# Patient Record
Sex: FEMALE | Race: OTHER | Employment: FULL TIME | URBAN - METROPOLITAN AREA
[De-identification: names, ages, dates, MRNs, and addresses within clinical notes are randomized per-mention and may not be internally consistent; named-entity substitution may affect disease eponyms.]

---

## 2018-09-13 ENCOUNTER — OFFICE VISIT (OUTPATIENT)
Dept: FAMILY MEDICINE CLINIC | Facility: CLINIC | Age: 38
End: 2018-09-13
Payer: COMMERCIAL

## 2018-09-13 VITALS
HEART RATE: 80 BPM | DIASTOLIC BLOOD PRESSURE: 82 MMHG | HEIGHT: 63 IN | WEIGHT: 155 LBS | BODY MASS INDEX: 27.46 KG/M2 | SYSTOLIC BLOOD PRESSURE: 126 MMHG

## 2018-09-13 DIAGNOSIS — K64.8 OTHER HEMORRHOIDS: Primary | ICD-10-CM

## 2018-09-13 DIAGNOSIS — D17.24 LIPOMA OF LEFT LOWER EXTREMITY: ICD-10-CM

## 2018-09-13 PROCEDURE — 99213 OFFICE O/P EST LOW 20 MIN: CPT | Performed by: NURSE PRACTITIONER

## 2018-09-13 NOTE — PROGRESS NOTES
HPI    Pt presents with lump to left lateral thigh for past two years or so. Denies pain but has noticed lately that has some numbness to leg when walking a lot. Pt also has h/o hemorrhoids-was evaluated in past and given cream but still having s/s.  Wou No current outpatient medications on file. Allergies:  No Known Allergies    Physical Exam   Nursing note and vitals reviewed. Constitutional: She is oriented to person, place, and time. She appears well-developed and well-nourished.    Cardiovascular:

## 2018-09-13 NOTE — PATIENT INSTRUCTIONS
Hemorroides    Las hemorroides son Earp Gayer hinchadas e inflamadas dentro del recto y cerca del ano. El recto está formado por el último tramo del colon. El ano es el pasaje entre el recto y el exterior del cuerpo.   Causas   Las venas pueden inflamarse cuan · Coma más fibra. La fibra agrega volumen a las heces y absorbe agua al ir moviéndose por el colon. [place] hace que las heces se ablanden y resulte más fácil expulsarlas. ? Aumente la fibra en pandey dieta. Para eso coma más alimentos ricos en fibra.  Estos incl © 9724-7265 [address]. [address] [facility] [place], [address] [facility]. Todos los derechos reservados. Esta información no pretende sustituir la atención médica profesional. Sólo pandey médico puede diagnosticar y tratar un problema de michelle. En la escleroterapia se inyecta katherine sustancia química en el tejido que rodea la hemorroide. Esta sustancia provoca la contracción del vaso hinchado en un plazo de varios días. Generalmente, la clifton mayi de sangrar al cabo de 24 horas.   Hemorroides externas © 3923-7900 [address]. 1407 [facility], 1612 [facility]. Todos los derechos reservados. Esta información no pretende sustituir la atención médica profesional. Sólo pandey médico puede diagnosticar y tratar un problema de michelle.

## 2018-09-20 ENCOUNTER — OFFICE VISIT (OUTPATIENT)
Dept: SURGERY | Facility: CLINIC | Age: 38
End: 2018-09-20
Payer: COMMERCIAL

## 2018-09-20 ENCOUNTER — HOSPITAL ENCOUNTER (OUTPATIENT)
Dept: ULTRASOUND IMAGING | Facility: HOSPITAL | Age: 38
Discharge: HOME | End: 2018-09-20
Attending: NURSE PRACTITIONER
Payer: COMMERCIAL

## 2018-09-20 VITALS — WEIGHT: 155 LBS | HEIGHT: 63 IN | BODY MASS INDEX: 27.46 KG/M2

## 2018-09-20 DIAGNOSIS — K64.4 EXTERNAL HEMORRHOIDS: Primary | ICD-10-CM

## 2018-09-20 DIAGNOSIS — D17.24 LIPOMA OF LEFT LOWER EXTREMITY: ICD-10-CM

## 2018-09-20 DIAGNOSIS — D17.24 BENIGN LIPOMATOUS NEOPLASM OF SKIN AND SUBCUTANEOUS TISSUE OF LEFT LEG: Primary | ICD-10-CM

## 2018-09-20 PROCEDURE — 99212 OFFICE O/P EST SF 10 MIN: CPT | Performed by: SURGERY

## 2018-09-20 PROCEDURE — 76882 US LMTD JT/FCL EVL NVASC XTR: CPT | Performed by: NURSE PRACTITIONER

## 2018-09-20 PROCEDURE — 99244 OFF/OP CNSLTJ NEW/EST MOD 40: CPT | Performed by: SURGERY

## 2018-09-20 NOTE — H&P
History and Physical      Prince Vicente is a 34year old female. HPI   Patient presents with:  Hemorrhoids: Pt referred by ALEX Gonzalez regarding external hemorrhoids x few yrs. Pt c/o intermittent bleeding w/ bm's.   Pt denies constipation @ [employer]    PHYSICAL EXAM   Ht 5' 3\" (1.6 m)   Wt 155 lb (70.3 kg)   LMP 09/02/2018   BMI 27.46 kg/m²  Patient's last menstrual period was 09/02/2018.   Constitutional: appears well hydrated alert and responsive no acute distress noted  Head/Face: normoceph with medical tx - ok to schedule excision - she understands risks.          9/20/2018  Thomas Olivares MD

## 2018-09-24 NOTE — PROGRESS NOTES
Called with the assistance of language line solutions (#). [phone]  Attempted to call preferred# [phone] no answer no voice mail  Attempted to call Mobile#:  [phone] number not in service

## 2018-09-25 ENCOUNTER — TELEPHONE (OUTPATIENT)
Dept: OTHER | Age: 38
End: 2018-09-25

## 2018-09-25 NOTE — TELEPHONE ENCOUNTER
Notes recorded by Benoit Patel RN on 9/25/2018 at 12:02 PM CDT  Preferred #, no answer. Unable to leave msg.  Mobile # rings and disconnects. Will send no response lab letter.   ------    Notes recorded by Emma Quiroz RN on 9/24/2018 at 10:44 AM CDT

## 2018-09-25 NOTE — TELEPHONE ENCOUNTER
Pt called back and was inform of Mathieu Cox message below and she verbalized understanding.  Thanks

## 2018-09-27 ENCOUNTER — OFFICE VISIT (OUTPATIENT)
Dept: OTOLARYNGOLOGY | Facility: CLINIC | Age: 38
End: 2018-09-27
Payer: COMMERCIAL

## 2018-09-27 VITALS
SYSTOLIC BLOOD PRESSURE: 118 MMHG | BODY MASS INDEX: 27.46 KG/M2 | DIASTOLIC BLOOD PRESSURE: 70 MMHG | TEMPERATURE: 98 F | HEIGHT: 63 IN | WEIGHT: 155 LBS

## 2018-09-27 DIAGNOSIS — J34.2 DEVIATED NASAL SEPTUM: ICD-10-CM

## 2018-09-27 DIAGNOSIS — J30.9 ALLERGIC RHINITIS, UNSPECIFIED SEASONALITY, UNSPECIFIED TRIGGER: Primary | ICD-10-CM

## 2018-09-27 PROCEDURE — 99212 OFFICE O/P EST SF 10 MIN: CPT | Performed by: OTOLARYNGOLOGY

## 2018-09-27 PROCEDURE — 99243 OFF/OP CNSLTJ NEW/EST LOW 30: CPT | Performed by: OTOLARYNGOLOGY

## 2018-09-27 RX ORDER — AZELASTINE 1 MG/ML
2 SPRAY, METERED NASAL 2 TIMES DAILY
Qty: 1 BOTTLE | Refills: 0 | Status: SHIPPED | OUTPATIENT
Start: 2018-09-27 | End: 2020-11-19 | Stop reason: ALTCHOICE

## 2018-09-27 RX ORDER — FLUTICASONE PROPIONATE 50 MCG
SPRAY, SUSPENSION (ML) NASAL DAILY
COMMUNITY
End: 2020-11-19 | Stop reason: ALTCHOICE

## 2018-09-27 RX ORDER — FEXOFENADINE HCL 180 MG/1
180 TABLET ORAL DAILY
COMMUNITY
End: 2020-11-19 | Stop reason: ALTCHOICE

## 2018-09-27 RX ORDER — MONTELUKAST SODIUM 10 MG/1
10 TABLET ORAL NIGHTLY
Qty: 30 TABLET | Refills: 3 | Status: SHIPPED | OUTPATIENT
Start: 2018-09-27 | End: 2020-11-19 | Stop reason: ALTCHOICE

## 2018-09-28 NOTE — PROGRESS NOTES
Miroslava Hawkins is a 34year old female.   Patient presents with:  Sinus Problem: congestion that worsens with change in season;symptoms have been ongoing for 5 yrs      HISTORY OF PRESENT ILLNESS  She presents with a 5-year history of worsening nasal congestion Endocrine Negative Cold intolerance and heat intolerance. Neuro Negative Tremors. Psych Negative Anxiety and depression. Integumentary Negative Frequent skin infections, pigment change and rash.    Hema/Lymph Negative Easy bleeding and easy bruising Azelastine HCl 0.1 % Nasal Solution, 2 sprays by Nasal route 2 (two) times daily. , Disp: 1 Bottle, Rfl: 0  •  Montelukast Sodium 10 MG Oral Tab, Take 1 tablet (10 mg total) by mouth nightly., Disp: 30 tablet, Rfl: 3  •  Loratadine-Pseudoephedrine ER 5-120

## 2018-10-04 ENCOUNTER — LAB REQUISITION (OUTPATIENT)
Dept: LAB | Facility: HOSPITAL | Age: 38
End: 2018-10-04
Payer: COMMERCIAL

## 2018-10-04 DIAGNOSIS — Z01.89 ENCOUNTER FOR OTHER SPECIFIED SPECIAL EXAMINATIONS: ICD-10-CM

## 2018-10-04 PROCEDURE — 88304 TISSUE EXAM BY PATHOLOGIST: CPT | Performed by: SURGERY

## 2018-10-10 ENCOUNTER — TELEPHONE (OUTPATIENT)
Dept: SURGERY | Facility: CLINIC | Age: 38
End: 2018-10-10

## 2018-10-10 NOTE — TELEPHONE ENCOUNTER
\"No precert required\" per Jose at [facility] for procedure on 10/04/2018, reference #[id number].

## 2018-10-12 ENCOUNTER — OFFICE VISIT (OUTPATIENT)
Dept: SURGERY | Facility: CLINIC | Age: 38
End: 2018-10-12
Payer: COMMERCIAL

## 2018-10-12 DIAGNOSIS — K64.4 ANAL SKIN TAG: ICD-10-CM

## 2018-10-12 DIAGNOSIS — K64.4 EXTERNAL HEMORRHOID: Primary | ICD-10-CM

## 2018-10-12 PROCEDURE — 99212 OFFICE O/P EST SF 10 MIN: CPT | Performed by: SURGERY

## 2018-10-12 PROCEDURE — 99024 POSTOP FOLLOW-UP VISIT: CPT | Performed by: SURGERY

## 2018-10-13 NOTE — PROGRESS NOTES
Postoperative Patient Follow-up      10/13/2018    Karen Lowe 34year old      HPI  Patient presents with:  Post-Op: Pt here for 1st post op s/p hemorrhoidectomy on 10/4/18. Pt c/o intermittent pain to area. Pt also noticed minimal blood with b/m.   Pt us

## 2018-10-25 ENCOUNTER — OFFICE VISIT (OUTPATIENT)
Dept: OTOLARYNGOLOGY | Facility: CLINIC | Age: 38
End: 2018-10-25
Payer: COMMERCIAL

## 2018-10-25 VITALS
TEMPERATURE: 98 F | DIASTOLIC BLOOD PRESSURE: 78 MMHG | SYSTOLIC BLOOD PRESSURE: 106 MMHG | BODY MASS INDEX: 27.46 KG/M2 | WEIGHT: 155 LBS | HEIGHT: 63 IN

## 2018-10-25 DIAGNOSIS — J34.2 DEVIATED NASAL SEPTUM: Primary | ICD-10-CM

## 2018-10-25 PROCEDURE — 99214 OFFICE O/P EST MOD 30 MIN: CPT | Performed by: OTOLARYNGOLOGY

## 2018-10-25 PROCEDURE — 99212 OFFICE O/P EST SF 10 MIN: CPT | Performed by: OTOLARYNGOLOGY

## 2018-10-25 NOTE — PROGRESS NOTES
Anjelica Bernal is a 34year old female. Patient presents with: Follow - Up: allergic rhinitis: pt reports a little improvement       HISTORY OF PRESENT ILLNESS  She presents with a 5-year history of worsening nasal congestion and allergies.   Always has diffi yrs ago       History reviewed. No pertinent past medical history.     Past Surgical History:   Procedure Laterality Date   • CORRECT BUNION,SIMPLE Left 2011   • HEMORRHOIDECTOMY  93/42/2043   • UMBILICAL HERNIA REPAIR  2008         REVIEW OF SYSTEMS    Sys Submandibular. Anterior cervical. Posterior cervical. Supraclavicular.         Nose/Mouth/Throat Normal External nose - Normal. Lips/teeth/gums - Normal. Tonsils - Normal. Oropharynx - Normal.   Nose/Mouth/Throat Normal Nares - Right: Normal Left: Normal. S

## 2018-10-31 ENCOUNTER — TELEPHONE (OUTPATIENT)
Dept: OTOLARYNGOLOGY | Facility: CLINIC | Age: 38
End: 2018-10-31

## 2018-11-01 NOTE — TELEPHONE ENCOUNTER
Pt contacted, scheduled surgery on 11/21/18 with Dr. Nicki Sanders. Pre-post op instructions reviewed.

## 2018-11-16 ENCOUNTER — OFFICE VISIT (OUTPATIENT)
Dept: SURGERY | Facility: CLINIC | Age: 38
End: 2018-11-16
Payer: COMMERCIAL

## 2018-11-16 VITALS — BODY MASS INDEX: 27 KG/M2 | WEIGHT: 155 LBS

## 2018-11-16 DIAGNOSIS — K64.8 INTERNAL HEMORRHOIDS WITH COMPLICATION: Primary | ICD-10-CM

## 2018-11-16 PROCEDURE — 99024 POSTOP FOLLOW-UP VISIT: CPT | Performed by: SURGERY

## 2018-11-16 PROCEDURE — 99212 OFFICE O/P EST SF 10 MIN: CPT | Performed by: SURGERY

## 2018-11-16 NOTE — PROGRESS NOTES
Established Patient Follow-up      11/16/2018    Liss Joshua 34year old      HPI  Patient presents with:  Post-Op: S/P Hemorrhoidectomy 10/4/18. Patient states she feels good, no problems with bowels, denies bleeding.   Is eating fiber and taking stool sof

## 2018-11-23 ENCOUNTER — TELEPHONE (OUTPATIENT)
Dept: OTOLARYNGOLOGY | Facility: CLINIC | Age: 38
End: 2018-11-23

## 2018-11-24 ENCOUNTER — TELEPHONE (OUTPATIENT)
Dept: OTOLARYNGOLOGY | Facility: CLINIC | Age: 38
End: 2018-11-24

## 2018-11-27 ENCOUNTER — TELEPHONE (OUTPATIENT)
Dept: OTOLARYNGOLOGY | Facility: CLINIC | Age: 38
End: 2018-11-27

## 2018-11-27 NOTE — TELEPHONE ENCOUNTER
Patient returning call for post op call.  Bengali speaking please call ( previous encounter was closed out)

## 2018-11-27 NOTE — TELEPHONE ENCOUNTER
Pt is post op  septoplasty, SMR. Per  Pt pt is doing well no bleeding, advised pt no bending or heavy lifting for the next week and pt is not to blow nose until seen by JDO.  Advised pt she can start using OTC saline nasal spray daily,apply vaseline if need

## 2018-11-29 ENCOUNTER — OFFICE VISIT (OUTPATIENT)
Dept: OTOLARYNGOLOGY | Facility: CLINIC | Age: 38
End: 2018-11-29
Payer: COMMERCIAL

## 2018-11-29 VITALS
WEIGHT: 155 LBS | BODY MASS INDEX: 27.46 KG/M2 | TEMPERATURE: 98 F | SYSTOLIC BLOOD PRESSURE: 128 MMHG | DIASTOLIC BLOOD PRESSURE: 80 MMHG | HEIGHT: 63 IN

## 2018-11-29 DIAGNOSIS — J34.2 DEVIATED NASAL SEPTUM: Primary | ICD-10-CM

## 2018-11-29 PROCEDURE — 99024 POSTOP FOLLOW-UP VISIT: CPT | Performed by: OTOLARYNGOLOGY

## 2018-11-29 PROCEDURE — 99212 OFFICE O/P EST SF 10 MIN: CPT | Performed by: OTOLARYNGOLOGY

## 2018-11-29 RX ORDER — CEPHALEXIN 500 MG/1
CAPSULE ORAL
Refills: 0 | COMMUNITY
Start: 2018-11-21 | End: 2020-11-19 | Stop reason: ALTCHOICE

## 2018-11-29 RX ORDER — DOCUSATE SODIUM 100 MG/1
CAPSULE, LIQUID FILLED ORAL
Refills: 0 | COMMUNITY
Start: 2018-10-04 | End: 2020-11-19 | Stop reason: ALTCHOICE

## 2018-11-30 NOTE — PROGRESS NOTES
Kobe Abreu is a 34year old female.   Patient presents with:  Post-Op: Septoplasty and SMR done on 11/21/18; pt reports she is doing well      HISTORY OF PRESENT ILLNESS  She presents with a 5-year history of worsening nasal congestion and allergies. Etta Marie BUNION,SIMPLE Left 2011   • EXCISION TURBINATE,SUBMUCOUS  11/21/2018   • HEMORRHOIDECTOMY  10/04/2018     x 2   • REPAIR OF NASAL SEPTUM  31/05/4376   • UMBILICAL HERNIA REPAIR  2008         REVIEW OF SYSTEMS    System Neg/Pos Details   Constitutional Knute Pro Posterior cervical. Supraclavicular.         Nose/Mouth/Throat Normal External nose - Normal. Lips/teeth/gums - Normal. Tonsils - Normal. Oropharynx - Normal.   Nose/Mouth/Throat Normal Nares - Right: Normal Left: Normal. Septum -Normal  Turbinates - Right:

## 2020-03-13 ENCOUNTER — OFFICE VISIT (OUTPATIENT)
Dept: FAMILY MEDICINE CLINIC | Facility: CLINIC | Age: 40
End: 2020-03-13
Payer: COMMERCIAL

## 2020-03-13 VITALS
WEIGHT: 152 LBS | BODY MASS INDEX: 26.93 KG/M2 | SYSTOLIC BLOOD PRESSURE: 127 MMHG | HEART RATE: 94 BPM | DIASTOLIC BLOOD PRESSURE: 87 MMHG | TEMPERATURE: 98 F | HEIGHT: 63 IN

## 2020-03-13 DIAGNOSIS — J35.8 TONSILLAR EXUDATE: Primary | ICD-10-CM

## 2020-03-13 PROCEDURE — 99213 OFFICE O/P EST LOW 20 MIN: CPT | Performed by: NURSE PRACTITIONER

## 2020-03-13 RX ORDER — AMOXICILLIN AND CLAVULANATE POTASSIUM 875; 125 MG/1; MG/1
1 TABLET, FILM COATED ORAL 2 TIMES DAILY
Qty: 14 TABLET | Refills: 0 | Status: SHIPPED | OUTPATIENT
Start: 2020-03-13 | End: 2020-03-20

## 2020-03-13 NOTE — PROGRESS NOTES
HPI    Patient presents for sore throat x 2 days. Denies fever. Review of Systems   Constitutional: Negative for fever. HENT: Positive for sore throat.     All other systems reviewed and are negative.       03/13/20  1303   BP: 127/87   Pulse: 94   T Talks on phone: Not on file        Gets together: Not on file        Attends Hindu service: Not on file        Active member of club or organization: Not on file        Attends meetings of clubs or organizations: Not on file        Relationship status: cerumen present  Mouth/Throat: Posterior oropharyngeal edema, posterior oropharyngeal erythema and tonsillar abscesses present. Eyes: Conjunctivae are normal. Right eye exhibits no discharge. Left eye exhibits no discharge.    Neck: Normal range of motion

## 2020-07-01 ENCOUNTER — HOSPITAL ENCOUNTER (OUTPATIENT)
Age: 40
Discharge: HOME | End: 2020-07-01
Attending: EMERGENCY MEDICINE
Payer: COMMERCIAL

## 2020-07-01 VITALS
TEMPERATURE: 98 F | RESPIRATION RATE: 16 BRPM | DIASTOLIC BLOOD PRESSURE: 88 MMHG | OXYGEN SATURATION: 100 % | SYSTOLIC BLOOD PRESSURE: 151 MMHG | HEART RATE: 88 BPM

## 2020-07-01 DIAGNOSIS — B37.3 YEAST VAGINITIS: ICD-10-CM

## 2020-07-01 DIAGNOSIS — R35.0 URINARY FREQUENCY: Primary | ICD-10-CM

## 2020-07-01 DIAGNOSIS — N39.3 STRESS INCONTINENCE: ICD-10-CM

## 2020-07-01 LAB
B-HCG UR QL: NEGATIVE
BILIRUB UR QL STRIP: NEGATIVE
CLARITY UR: CLEAR
COLOR UR: YELLOW
GLUCOSE UR STRIP-MCNC: NEGATIVE MG/DL
KETONES UR STRIP-MCNC: NEGATIVE MG/DL
LEUKOCYTE ESTERASE UR QL STRIP: NEGATIVE
NITRITE UR QL STRIP: NEGATIVE
PH UR STRIP: 6 [PH]
PROT UR STRIP-MCNC: NEGATIVE MG/DL
SP GR UR STRIP: 1.01
UROBILINOGEN UR STRIP-ACNC: <2 MG/DL

## 2020-07-01 PROCEDURE — 81002 URINALYSIS NONAUTO W/O SCOPE: CPT | Performed by: EMERGENCY MEDICINE

## 2020-07-01 PROCEDURE — 81025 URINE PREGNANCY TEST: CPT | Performed by: EMERGENCY MEDICINE

## 2020-07-01 PROCEDURE — 99203 OFFICE O/P NEW LOW 30 MIN: CPT | Performed by: EMERGENCY MEDICINE

## 2020-07-01 RX ORDER — FLUCONAZOLE 150 MG/1
150 TABLET ORAL ONCE
Qty: 1 TABLET | Refills: 1 | Status: SHIPPED | OUTPATIENT
Start: 2020-07-01 | End: 2020-07-01

## 2020-07-02 NOTE — ED PROVIDER NOTES
Patient Seen in: [facility] In [address] [place] [address]      History   Patient presents with:  Eval-G    Stated Complaint: YEAST INFECTION    HPI  Patient is 2 concerns.   First she thinks she has a yeast infection and has been given the pill before Exam  Vitals signs and nursing note reviewed. Constitutional:       General: She is not in acute distress. Appearance: She is well-developed. Comments: Well appearing   HENT:      Head: Normocephalic and atraumatic.       Right Ear: External ear department for further evaluation.            MDM                 Disposition and Plan     Clinical Impression:  Urinary frequency  (primary encounter diagnosis)  Stress incontinence  Yeast vaginitis    Disposition:  Discharge  7/1/2020  7:30 pm    Follow-u

## 2020-07-22 ENCOUNTER — HOSPITAL ENCOUNTER (OUTPATIENT)
Age: 40
Discharge: HOME | End: 2020-07-22
Attending: FAMILY MEDICINE
Payer: COMMERCIAL

## 2020-07-22 VITALS
HEART RATE: 70 BPM | SYSTOLIC BLOOD PRESSURE: 130 MMHG | BODY MASS INDEX: 26.58 KG/M2 | WEIGHT: 150 LBS | OXYGEN SATURATION: 98 % | DIASTOLIC BLOOD PRESSURE: 86 MMHG | HEIGHT: 63 IN | RESPIRATION RATE: 18 BRPM | TEMPERATURE: 99 F

## 2020-07-22 DIAGNOSIS — Z20.822 EXPOSURE TO COVID-19 VIRUS: ICD-10-CM

## 2020-07-22 DIAGNOSIS — R09.81 CONGESTION OF NASAL SINUS: Primary | ICD-10-CM

## 2020-07-22 PROCEDURE — 99202 OFFICE O/P NEW SF 15 MIN: CPT | Performed by: FAMILY MEDICINE

## 2020-07-22 NOTE — ED PROVIDER NOTES
Patient Seen in: [facility] In [address] [place] [address]      History   Patient presents with:  Nasal Congestion    Stated Complaint: Back Pain, Sore Throat, Headache, Runny Nose    HPI    Pt is a 40 yo with nasal congestion, throat pain, and bodyac Pupils: Pupils are equal, round, and reactive to light. Neck:      Musculoskeletal: Normal range of motion and neck supple. Cardiovascular:      Rate and Rhythm: Normal rate and regular rhythm. Pulses: Normal pulses.       Heart sounds: Normal hear

## 2020-07-22 NOTE — ED INITIAL ASSESSMENT (HPI)
Pt c/o nasal congestion, sinus pressure, watery eyes and lack of taste and smell x 3 days. Also c/o R lowe back pain that radiates down R buttock and leg x 3 days. Denies injury.

## 2020-07-23 LAB — SARS-COV-2 RNA RESP QL NAA+PROBE: DETECTED

## 2020-08-03 ENCOUNTER — TELEPHONE (OUTPATIENT)
Dept: FAMILY MEDICINE CLINIC | Facility: CLINIC | Age: 40
End: 2020-08-03

## 2020-08-03 NOTE — TELEPHONE ENCOUNTER
Togolese call transferred by CSS: Pt reports was diagnosed with COVID-19 on 7/22/20 and was told by employer needs to have office visit with PCP to be cleared for work. Pt scheduled virtual visit for tomorrow with Beth JOHNSON; informed of [employer] process.  P

## 2020-08-04 ENCOUNTER — TELEMEDICINE (OUTPATIENT)
Dept: FAMILY MEDICINE CLINIC | Facility: CLINIC | Age: 40
End: 2020-08-04
Payer: COMMERCIAL

## 2020-08-04 DIAGNOSIS — U07.1 COVID-19: Primary | ICD-10-CM

## 2020-08-04 PROCEDURE — 99213 OFFICE O/P EST LOW 20 MIN: CPT | Performed by: NURSE PRACTITIONER

## 2020-08-04 NOTE — PROGRESS NOTES
HPI    Virtual Telephone/Video Doximity Check-In    Ranjan Douglass verbally consents to a Virtual/Telephone Check-In visit on 08/04/20. Patient has been referred to the [facility] website at [email or web address] to review the yearly Consent to Treat document.     Social Needs      Financial resource strain: Not on file      Food insecurity:        Worry: Not on file        Inability: Not on file      Transportation needs:        Medical: Not on file        Non-medical: Not on file    Tobacco Use      Sm not taking: Reported on 3/13/2020 ) 60 tablet 3       Allergies:    Pollen                  Runny nose    Physical Exam   Nursing note reviewed. Constitutional: She is oriented to person, place, and time. She appears well-developed and well-nourished.

## 2020-09-29 ENCOUNTER — OFFICE VISIT (OUTPATIENT)
Dept: SURGERY | Facility: CLINIC | Age: 40
End: 2020-09-29
Payer: COMMERCIAL

## 2020-09-29 VITALS
HEART RATE: 74 BPM | BODY MASS INDEX: 26.58 KG/M2 | HEIGHT: 63 IN | SYSTOLIC BLOOD PRESSURE: 130 MMHG | WEIGHT: 150 LBS | DIASTOLIC BLOOD PRESSURE: 82 MMHG

## 2020-09-29 DIAGNOSIS — R35.1 NOCTURIA: ICD-10-CM

## 2020-09-29 DIAGNOSIS — B37.9 YEAST INFECTION: ICD-10-CM

## 2020-09-29 DIAGNOSIS — N39.3 STRESS INCONTINENCE: ICD-10-CM

## 2020-09-29 DIAGNOSIS — N76.1 CHRONIC VAGINITIS: Primary | ICD-10-CM

## 2020-09-29 LAB
BACTERIA UR QL AUTO: NEGATIVE /HPF
BILIRUB UR QL: NEGATIVE
CLARITY UR: CLEAR
COLOR UR: YELLOW
GLUCOSE UR-MCNC: NEGATIVE MG/DL
KETONES UR-MCNC: NEGATIVE MG/DL
LEUKOCYTE ESTERASE UR QL STRIP.AUTO: NEGATIVE
NITRITE UR QL STRIP.AUTO: NEGATIVE
PH UR: 7 [PH] (ref 5–8)
PROT UR-MCNC: NEGATIVE MG/DL
RBC #/AREA URNS AUTO: 0 /HPF
SP GR UR STRIP: 1.01 (ref 1–1.03)
UROBILINOGEN UR STRIP-ACNC: <2
WBC #/AREA URNS AUTO: 0 /HPF

## 2020-09-29 PROCEDURE — 3008F BODY MASS INDEX DOCD: CPT | Performed by: UROLOGY

## 2020-09-29 PROCEDURE — 99204 OFFICE O/P NEW MOD 45 MIN: CPT | Performed by: UROLOGY

## 2020-09-29 PROCEDURE — 3075F SYST BP GE 130 - 139MM HG: CPT | Performed by: UROLOGY

## 2020-09-29 PROCEDURE — 3079F DIAST BP 80-89 MM HG: CPT | Performed by: UROLOGY

## 2020-09-29 RX ORDER — FLUCONAZOLE 150 MG/1
TABLET ORAL
COMMUNITY
Start: 2020-09-26 | End: 2020-11-19 | Stop reason: ALTCHOICE

## 2020-09-29 NOTE — PROGRESS NOTES
Major Mass is a 41year old female. Reason for Consultation:       History provided by patient. Referred by her PCP, Dr. Mary Beth Campbell. History of Present Illness:       Recurrent Yeast Infection  Problem started beginning 2019.  She was sexu tab\"    Urological History-- This is the patient's first time seeing a urologist. Patient has history of recurrent UTIs. Smoking History & Fume Exposure-- She denies all smoking history and fume exposure.      Family History-- Patient states her sister hematuria. Positive for urinary frequency, stress incontinence, urinary urgency, and nocturia. Patient is not sexually active. Positive for vaginal discharge and itching.    Gastrointestinal:  Negative for abdominal pain, constipation, decreased appetite, unremarkable.   Vaginal introitus  Normal; no vaginal discharge present  Meatus of the urethra  Normal   Mucosa of the vagina  Normal   Urethrocele  Grade 1   Cystocele     Grade 1   Rectocele     None significant   Hypermobility of bladder neck  Minimal urine culture and UA.      (B37.9) Yeast infection  Please see Chronic vaginitis above.     (N39.3) Stress incontinence  Chronic. Mild. Problem started after having 2 vaginal births; second child born around 2011.  Patient states she leaks a few drops almos when you cough or sneeze. I will asked my nurses to give you a handout concerning the exercises. 4.  To try to decrease the number of times you wake up at night to urinate, please follow the recommendations below    a.  Because tea and coffee contain ca watermelon, soup, stew, chili, or \"wet\" food for 3 hours before bedtime. Thank you for sending this  patient to our urology service ( I am partner with Dr. Anupam Whitney).   We will do our  best to keep you informed of  all significant urological  developmen

## 2020-11-19 ENCOUNTER — OFFICE VISIT (OUTPATIENT)
Dept: FAMILY MEDICINE CLINIC | Facility: CLINIC | Age: 40
End: 2020-11-19
Payer: COMMERCIAL

## 2020-11-19 VITALS
BODY MASS INDEX: 27.29 KG/M2 | DIASTOLIC BLOOD PRESSURE: 80 MMHG | HEART RATE: 70 BPM | SYSTOLIC BLOOD PRESSURE: 133 MMHG | HEIGHT: 63 IN | WEIGHT: 154 LBS

## 2020-11-19 DIAGNOSIS — Z00.00 ROUTINE GENERAL MEDICAL EXAMINATION AT A HEALTH CARE FACILITY: Primary | ICD-10-CM

## 2020-11-19 DIAGNOSIS — Z00.00 WELLNESS EXAMINATION: ICD-10-CM

## 2020-11-19 PROBLEM — K64.8 OTHER HEMORRHOIDS: Status: RESOLVED | Noted: 2018-09-13 | Resolved: 2020-11-19

## 2020-11-19 PROCEDURE — 3008F BODY MASS INDEX DOCD: CPT | Performed by: PHYSICIAN ASSISTANT

## 2020-11-19 PROCEDURE — 99396 PREV VISIT EST AGE 40-64: CPT | Performed by: PHYSICIAN ASSISTANT

## 2020-11-19 PROCEDURE — 3075F SYST BP GE 130 - 139MM HG: CPT | Performed by: PHYSICIAN ASSISTANT

## 2020-11-19 PROCEDURE — 3079F DIAST BP 80-89 MM HG: CPT | Performed by: PHYSICIAN ASSISTANT

## 2020-11-19 NOTE — PATIENT INSTRUCTIONS
Pautas de prevención para mujeres de entre 36 y 52 años de edad  [address] [place] [address] detección y las vacunas son importantes para el manejo de pandey michelle.  Las pruebas de detección se realizan para detectar posibles trastornos o enfermedades en personas que no t proveedor de [facility] [employer] para que la ayude a decidir en qué momento comenzar con los exámenes de detección. A partir de los [place], comience con katherine mamografía al [employer]    Cáncer del karyn uterino [employer]  de [address] case[address] [place] [facility] infección o de haberse aplicado esta vacuna Dos dosis.  La segunda dosis debería aplicársela al menos cuatro semanas después de la primera dosis   Hepatitis A Las mujeres con mayor riesgo de infección; hable con pandey proveedor de [facility] Dos dosis que Violencia doméstica Las mujeres en edad de tener hijos En los exámenes de rutina   Prevención de infecciones de trasmisión sexual Las mujeres con mayor riesgo de infección; hable con pandey proveedor de atención médica En los exámenes de rutina   Uso del tab

## 2020-11-20 NOTE — PROGRESS NOTES
HPI:   Milad Porras is a 41year old female who presents for an Annual Health Visit. Patient is doing fine at this time. Patient denies of chest pain, SOB, N/V/C/D, fever, dizziness, syncope, abdominal pain. There are no other concerns today.     Allerg Constitutional: She is oriented to person, place, and time. She appears well-developed and well-nourished. HENT:   Head: Normocephalic and atraumatic.    Right Ear: External ear normal.   Left Ear: External ear normal.   Nose: Nose normal.   Mouth/Throat: Las pruebas de detección y las vacunas son importantes para el manejo de pandey michelle. Las pruebas de detección se realizan para detectar posibles trastornos o enfermedades en personas que no tienen ningún síntoma.  El objetivo es encontrar katherine enfermedad de ma Cáncer de mama Todas las mujeres de tanisha mary de edad que tengan un riesgo promedio. El examen de detección con katherine mamografía puede comenzar a los 34 años. 2 Consulte a pandey proveedor de [employer] para que la ayude a decidir en qué momento comenzar co [place] Todas las mujeres de tanisha mary de edad Un examen completo a los 34 años y exámenes de los ojos cada dos a cuatro años.  Si tiene katherine enfermedad crónica, hable con pandey proveedor de [employer] para saber con qué frecuencia debería hacerse examinar Pruebas sobre Thomas [place] de los genes BRCA para ferd el riesgo de tener cáncer de ovario y cáncer de mama Las mujeres con mayor riesgo de tener mutación de los genes Cuando se conoce pandey riesgo   Cáncer de mama y prevención química del cáncer Las mujeres con a

## 2021-03-08 ENCOUNTER — OFFICE VISIT (OUTPATIENT)
Dept: FAMILY MEDICINE CLINIC | Facility: CLINIC | Age: 41
End: 2021-03-08
Payer: COMMERCIAL

## 2021-03-08 VITALS
HEART RATE: 75 BPM | BODY MASS INDEX: 27.11 KG/M2 | WEIGHT: 153 LBS | SYSTOLIC BLOOD PRESSURE: 123 MMHG | HEIGHT: 63 IN | DIASTOLIC BLOOD PRESSURE: 82 MMHG

## 2021-03-08 DIAGNOSIS — N89.8 VAGINAL ITCHING: ICD-10-CM

## 2021-03-08 DIAGNOSIS — R30.0 DYSURIA: Primary | ICD-10-CM

## 2021-03-08 LAB
APPEARANCE: CLEAR
MULTISTIX LOT#: 5077 NUMERIC
PH, URINE: 6 (ref 4.5–8)
SPECIFIC GRAVITY: 1.02 (ref 1–1.03)
URINE-COLOR: YELLOW
UROBILINOGEN,SEMI-QN: 0.2 MG/DL (ref 0–1.9)

## 2021-03-08 PROCEDURE — 3074F SYST BP LT 130 MM HG: CPT | Performed by: NURSE PRACTITIONER

## 2021-03-08 PROCEDURE — 81003 URINALYSIS AUTO W/O SCOPE: CPT | Performed by: NURSE PRACTITIONER

## 2021-03-08 PROCEDURE — 3079F DIAST BP 80-89 MM HG: CPT | Performed by: NURSE PRACTITIONER

## 2021-03-08 PROCEDURE — 99213 OFFICE O/P EST LOW 20 MIN: CPT | Performed by: NURSE PRACTITIONER

## 2021-03-08 PROCEDURE — 3008F BODY MASS INDEX DOCD: CPT | Performed by: NURSE PRACTITIONER

## 2021-03-08 NOTE — PROGRESS NOTES
HPI  Pt here for vaginal discharge, itching; burning w urination and low back pain. Symptoms started 2 days ago. Review of Systems   Constitutional: Negative for activity change and fever. Genitourinary: Positive for dysuria and vaginal discharge. Food in the Last Year:   Transportation Needs:       Lack of Transportation (Medical):       Lack of Transportation (Non-Medical):   Physical Activity:       Days of Exercise per Week:       Minutes of Exercise per Session:   Stress:       Feeling of Stres

## 2021-03-10 LAB
GENITAL VAGINOSIS SCREEN: POSITIVE
TRICHOMONAS SCREEN: NEGATIVE

## 2022-04-27 ENCOUNTER — OFFICE VISIT (OUTPATIENT)
Dept: SURGERY | Facility: CLINIC | Age: 42
End: 2022-04-27
Payer: COMMERCIAL

## 2022-04-27 DIAGNOSIS — R35.0 URINARY FREQUENCY: ICD-10-CM

## 2022-04-27 DIAGNOSIS — N39.41 URGE INCONTINENCE: ICD-10-CM

## 2022-04-27 DIAGNOSIS — N76.0 RECURRENT VAGINITIS: ICD-10-CM

## 2022-04-27 DIAGNOSIS — N39.3 STRESS INCONTINENCE: Primary | ICD-10-CM

## 2022-04-27 LAB
APPEARANCE: CLEAR
BILIRUBIN: NEGATIVE
GLUCOSE (URINE DIPSTICK): NEGATIVE MG/DL
KETONES (URINE DIPSTICK): NEGATIVE MG/DL
LEUKOCYTES: NEGATIVE
MULTISTIX EXPIRATION DATE: NORMAL DATE
MULTISTIX LOT#: NORMAL NUMERIC
NITRITE, URINE: NEGATIVE
OCCULT BLOOD: NEGATIVE
PH, URINE: 7 (ref 4.5–8)
PROTEIN (URINE DIPSTICK): NEGATIVE MG/DL
SPECIFIC GRAVITY: 1.02 (ref 1–1.03)
URINE-COLOR: YELLOW
UROBILINOGEN,SEMI-QN: 0.2 MG/DL (ref 0–1.9)

## 2022-04-27 PROCEDURE — 81003 URINALYSIS AUTO W/O SCOPE: CPT | Performed by: NURSE PRACTITIONER

## 2022-04-27 PROCEDURE — 99214 OFFICE O/P EST MOD 30 MIN: CPT | Performed by: NURSE PRACTITIONER

## 2022-04-27 RX ORDER — NITROFURANTOIN 25; 75 MG/1; MG/1
1 CAPSULE ORAL AS DIRECTED
COMMUNITY

## 2022-04-27 RX ORDER — ESOMEPRAZOLE MAGNESIUM 40 MG/1
CAPSULE, DELAYED RELEASE ORAL
COMMUNITY

## 2022-04-27 RX ORDER — FLUCONAZOLE 150 MG/1
TABLET ORAL
COMMUNITY
Start: 2022-04-24

## 2022-04-27 RX ORDER — DIAPER,BRIEF,INFANT-TODD,DISP
EACH MISCELLANEOUS
COMMUNITY

## 2022-04-27 RX ORDER — SULFACETAMIDE SODIUM, SULFUR 100; 50 MG/G; MG/G
SUSPENSION TOPICAL
COMMUNITY

## 2022-04-27 NOTE — PATIENT INSTRUCTIONS
1.  Pelvic floor rehab. Please call them to schedule a consultation    2. Consider seeing a uro/gynecologist, I have given you a referra    3.   Start a womens probiotic

## 2023-08-08 ENCOUNTER — OFFICE VISIT (OUTPATIENT)
Dept: UROLOGY | Facility: HOSPITAL | Age: 43
End: 2023-08-08
Attending: OBSTETRICS & GYNECOLOGY
Payer: COMMERCIAL

## 2023-08-08 DIAGNOSIS — N39.41 URGE INCONTINENCE: ICD-10-CM

## 2023-08-08 DIAGNOSIS — N39.3 FEMALE STRESS INCONTINENCE: Primary | ICD-10-CM

## 2023-08-08 PROCEDURE — 99212 OFFICE O/P EST SF 10 MIN: CPT

## 2023-08-08 NOTE — PROGRESS NOTES
ID: Ryland Treadwell  : 1980  Date: 2023       Patient presents with: Incontinence      HPI:  The patient is a 37year-old female,  (vaginal deliveries), who was last seen at the [facility] on 10/17/22. She presented with symptoms of urinary leaking for the past 2 years. She is bothered by both DANIKA and UUI. No UTIs. Periods are regular. Uses condoms for birth control. Bowels are regular. Denies dyspareunia. Healthy otherwise. Initial exam demonstrated no prolapse, no hypermobility. Agreed to proceed with UDS. Returns for follow up. Interval history:   The patient was unable to schedule UDS. She was in [facility].  Symptoms are unchanged. She wants to have surgery for DANIKA. Denies vaginitis symptoms. Urogynecology Summary:  Urogynecology Summary  Prolapse: No  DANIKA: Yes (coughing ,running or exercising)  Urge Incontinence: Yes (rare)  Nocturia Frequency: 2  Frequency: 2 hours  Incomplete emptying: No  Constipation: Yes  Wears pad day?: 1  Wears Pad Night?: 0  Activities are limited by UI/POP?: Yes  Currently Sexually Active: Yes          HISTORY:  No past medical history on file.    Past Surgical History:   Procedure Laterality Date    CORRECT BUNION,SIMPLE Left     EXCISION TURBINATE,SUBMUCOUS  2018    HEMORRHOIDECTOMY  10/04/2018     x 2    REPAIR OF NASAL SEPTUM      UMBILICAL HERNIA REPAIR        Family History   Problem Relation Age of Onset    Other (Other) Father          work accident 22 yrs ago      Social History     Socioeconomic History    Marital status:     Number of children: 2   Occupational History    Occupation:    Tobacco Use    Smoking status: Never    Smokeless tobacco: Never   Vaping Use    Vaping Use: Never used   Substance and Sexual Activity    Alcohol use: No    Drug use: No   Social History Narrative    ** Merged History Encounter **             Allergies:    Pollen                  Runny nose    Medications:  Esomeprazole Magnesium (NEXIUM) 40 MG Oral Capsule Delayed Release, Nexium 40 mg capsule,delayed release, Disp: , Rfl:   fluconazole 150 MG Oral Tab, TAKE 1 TABLET BY MOUTH TODAY. THEN TAKE 1 TABLET BY MOUTH 72 HOURS AFTER FIRST DOSE., Disp: , Rfl:   hydrocortisone 1 % External Cream, hydrocortisone 1 % topical cream (Patient not taking: Reported on 8/8/2023), Disp: , Rfl:   nitrofurantoin monohydrate macro 100 MG Oral Cap, Take 1 capsule (100 mg total) by mouth As Directed. (Patient not taking: Reported on 8/8/2023), Disp: , Rfl:   Sulfacetamide Sodium-Sulfur 10-5 % External Suspension, PRASCION 10 %-5 % (w/w) topical cleanser (Patient not taking: Reported on 8/8/2023), Disp: , Rfl:     No facility-administered medications prior to visit. Review of Systems:    A comprehensive 12 point review of systems was completed. Pertinent positives noted in the the HPI. Denies CP  Denies SOB    Vitals: There were no vitals taken for this visit. General: Alert and oriented x 3, no acute distress. Pelvic: deferred. Impression:  (N39.3) Female stress incontinence  (primary encounter diagnosis)    (N39.41) Urge incontinence      Plan:   Declines PT  Information provided on sling procedures for DANIKA  Agrees to proceed with UDS   information provided  Follow up after testing.      Usman Harrington MD, [facility]  Female Pelvic Medicine and  Reconstructive Surgery (Urogynecology)  [phone] (Pager) STAT K on admit

## 2023-08-21 ENCOUNTER — OFFICE VISIT (OUTPATIENT)
Dept: UROLOGY | Facility: HOSPITAL | Age: 43
End: 2023-08-21
Payer: COMMERCIAL

## 2023-08-21 VITALS
RESPIRATION RATE: 16 BRPM | BODY MASS INDEX: 27.11 KG/M2 | SYSTOLIC BLOOD PRESSURE: 122 MMHG | WEIGHT: 153 LBS | DIASTOLIC BLOOD PRESSURE: 80 MMHG | HEIGHT: 63 IN

## 2023-08-21 DIAGNOSIS — N39.3 FEMALE STRESS INCONTINENCE: Primary | ICD-10-CM

## 2023-08-21 DIAGNOSIS — N39.41 URGE INCONTINENCE: ICD-10-CM

## 2023-08-21 LAB
BLOOD URINE: NEGATIVE
CONTROL RUN WITHIN 24 HOURS?: YES
LEUKOCYTE ESTERASE URINE: NEGATIVE
NITRITE URINE: NEGATIVE

## 2023-08-21 PROCEDURE — 51741 ELECTRO-UROFLOWMETRY FIRST: CPT

## 2023-08-21 PROCEDURE — 51784 ANAL/URINARY MUSCLE STUDY: CPT

## 2023-08-21 PROCEDURE — 51797 INTRAABDOMINAL PRESSURE TEST: CPT

## 2023-08-21 PROCEDURE — 51729 CYSTOMETROGRAM W/VP&UP: CPT

## 2023-08-21 PROCEDURE — 81002 URINALYSIS NONAUTO W/O SCOPE: CPT

## 2023-08-21 NOTE — PROCEDURES
Patient here for urodynamic testing. Procedure explained and confirmed by patient. See evaluation form for results. Both verbal and written discharge instructions were given. Patient tolerated procedure well and will follow up with Dr. Guillermo Su in office  on 2023@ 10:20 am .    URODYNAMIC EVALUATION    PATIENT HISTORY:    Prolapse:  No  DANIKA:  Yes (most bothersome)  UUI:  Yes  Nocturia:  1-2  Frequency:  2 hours   Sense of Incomplete Emptying:  Yes  Constipation:  No ( bowel regimen discussed and handout given)  Last void prior to UDS testin minutes   Current urge to void?  mild  OAB meds stopped prior to test?  NA  Other symptoms? Surgery? []  No  [x]  Interested in surgery, specify date: Surgery scheduled for Oct 19 @1 pm per patient     Surgical folder provided? []  Yes  [x]  No     PATIENT DIAGNOSIS:  Urge Incontinence N39.41 and Stress Incontinence N39.3    UDS PROCEDURAL FINDINGS:  Stress Incontinence N39.3         ALLERGIES:  Pollen      EXAM:  Urinalysis Dip: Nitrates -negative, blood- negative and leukocytes-negative        Urovesico Junction ( <30 degrees ):  []  Mobile  [x]  Fixed    Perineal Sensation:  [x]  Normal  []  Abnormal    Additional Notes:    PROLAPSE:  []  Yes  [x]  No  Prolapse reduced for testing?   []  Yes  [x]  No  []  Pessary  []  Manual  []  Half Speculum    Additional Notes:    UROFLOWMETRY:  Unreduced  Voided Volume:            123                 mL  Maximum Flow Rate:               14                 mL/sec  Average flow rate:              4               mL/sec  Post-void Residual:             50              mL  Pattern:  [x]  Normal  []  Poor flow     []  Intermittent  []  Other  Void:   [x]  Typical  []  Atypical    Additional Notes:    CYSTOMETRY:  Urethral Catheter:  Fr 7 / tdoc  Abd Catheter:     Fr 7 / tdoc   Infusion:  Water Rate 30 mL/min  Temp:  Room  Position:  [x]  Sit  []  Stand  []  Supine  First sensation:   26 mL  First desire to void: 115 mL  Strong desire to void:  266 mL  Maximum cystometric capacity:   300 mL  Detrusor Activity:  []  Unstable   [x]  Stable  Urge leakage? []  Yes [x]  No  Volume at 1st unhibited detrusor cont:   N/A mL  Detrusor instability provoked by:    []  Spontaneous []  Coughing  []  Filling  []  Valsalva  []  Other    Additional Notes:      URETHRAL FUNCTION:  Valsava (vesical) Leak Point Pressures:    Volume Leak Point Pressure Leak? Cough Valsalva      100mL 162 173    cm H2O [x]  Yes with both cough and valsalva  []  No   200mL 191 153    cm H2O [x]  Yes with both cough and valsalva []  No   300mL 188 139    cm H2O [x]  Yes with cough and valsalva  []  No         REDUCED:  Genuine Stress Incontinence demonstrated?    [x]  Yes @ 100  Unreduced  with cough and valsalva  []  No    Resting Urethral Pressure Profile:     Functional Urethral Length:      0.3  cm      0.3       cm     Maximum UCP:           130     cm          104   cm         PRESSURE/FLOW STUDY:  Unreduced  Voided volume:   524     mL  Maximum flow rate:     N/A   mL/sec  Pressure Detrusor (at maximum flow):     N/A       cm H2O  Post void residual:      62         mL  Voiding mechanism:  []  Abnormal  [x]  Normal  []  Strain to void   []  Weak detrusor  Void:   [x]  Typical   []  Atypical    Additional Notes:    EMG:  During fill: Reactive    During flow study: Reactive    8/21/2023 1:09 PM     PERFORMED BY:  Simone Hill RN

## 2023-09-19 ENCOUNTER — OFFICE VISIT (OUTPATIENT)
Dept: UROLOGY | Facility: HOSPITAL | Age: 43
End: 2023-09-19
Attending: OBSTETRICS & GYNECOLOGY
Payer: COMMERCIAL

## 2023-09-19 VITALS — RESPIRATION RATE: 16 BRPM | BODY MASS INDEX: 27.11 KG/M2 | WEIGHT: 153 LBS | HEIGHT: 63 IN

## 2023-09-19 DIAGNOSIS — N39.41 URGE INCONTINENCE: ICD-10-CM

## 2023-09-19 DIAGNOSIS — N39.3 FEMALE STRESS INCONTINENCE: Primary | ICD-10-CM

## 2023-09-19 PROCEDURE — 99212 OFFICE O/P EST SF 10 MIN: CPT

## 2023-10-05 ENCOUNTER — TELEPHONE (OUTPATIENT)
Dept: UROLOGY | Facility: HOSPITAL | Age: 43
End: 2023-10-05

## 2023-10-05 NOTE — TELEPHONE ENCOUNTER
TC from pt requesting fax number to send [facility] paperwork. Fax number provided. Informed pt we will keep a look out for an incoming fax and have Dr. Britta Membreno sign when here on Tuesday. Pt verbalized understanding.

## 2023-10-19 ENCOUNTER — ANESTHESIA (OUTPATIENT)
Dept: SURGERY | Facility: HOSPITAL | Age: 43
End: 2023-10-19
Payer: COMMERCIAL

## 2023-10-19 ENCOUNTER — ANESTHESIA EVENT (OUTPATIENT)
Dept: SURGERY | Facility: HOSPITAL | Age: 43
End: 2023-10-19
Payer: COMMERCIAL

## 2023-10-19 ENCOUNTER — HOSPITAL ENCOUNTER (OUTPATIENT)
Facility: HOSPITAL | Age: 43
Setting detail: HOSPITAL OUTPATIENT SURGERY
Discharge: HOME | End: 2023-10-19
Attending: OBSTETRICS & GYNECOLOGY | Admitting: OBSTETRICS & GYNECOLOGY
Payer: COMMERCIAL

## 2023-10-19 VITALS
HEIGHT: 63 IN | HEART RATE: 79 BPM | WEIGHT: 154 LBS | DIASTOLIC BLOOD PRESSURE: 97 MMHG | OXYGEN SATURATION: 100 % | SYSTOLIC BLOOD PRESSURE: 117 MMHG | BODY MASS INDEX: 27.29 KG/M2 | RESPIRATION RATE: 18 BRPM | TEMPERATURE: 98 F

## 2023-10-19 LAB — B-HCG UR QL: NEGATIVE

## 2023-10-19 PROCEDURE — 0TSD0ZZ REPOSITION URETHRA, OPEN APPROACH: ICD-10-PCS | Performed by: OBSTETRICS & GYNECOLOGY

## 2023-10-19 PROCEDURE — 81025 URINE PREGNANCY TEST: CPT

## 2023-10-19 DEVICE — TRANSVAGINAL MID-URETHRAL SLING
Type: IMPLANTABLE DEVICE | Site: URETHRA | Status: FUNCTIONAL
Brand: ADVANTAGE FIT™ BLUE SYSTEM

## 2023-10-19 RX ORDER — ONDANSETRON 2 MG/ML
4 INJECTION INTRAMUSCULAR; INTRAVENOUS EVERY 6 HOURS PRN
Status: DISCONTINUED | OUTPATIENT
Start: 2023-10-19 | End: 2023-10-19

## 2023-10-19 RX ORDER — ACETAMINOPHEN 500 MG
1000 TABLET ORAL ONCE
Status: COMPLETED | OUTPATIENT
Start: 2023-10-19 | End: 2023-10-19

## 2023-10-19 RX ORDER — NALOXONE HYDROCHLORIDE 0.4 MG/ML
80 INJECTION, SOLUTION INTRAMUSCULAR; INTRAVENOUS; SUBCUTANEOUS AS NEEDED
Status: DISCONTINUED | OUTPATIENT
Start: 2023-10-19 | End: 2023-10-19

## 2023-10-19 RX ORDER — SCOLOPAMINE TRANSDERMAL SYSTEM 1 MG/1
1 PATCH, EXTENDED RELEASE TRANSDERMAL
Status: DISCONTINUED | OUTPATIENT
Start: 2023-10-19 | End: 2023-10-19 | Stop reason: HOSPADM

## 2023-10-19 RX ORDER — MORPHINE SULFATE 4 MG/ML
2 INJECTION, SOLUTION INTRAMUSCULAR; INTRAVENOUS EVERY 10 MIN PRN
Status: DISCONTINUED | OUTPATIENT
Start: 2023-10-19 | End: 2023-10-19

## 2023-10-19 RX ORDER — HYDROMORPHONE HYDROCHLORIDE 1 MG/ML
0.4 INJECTION, SOLUTION INTRAMUSCULAR; INTRAVENOUS; SUBCUTANEOUS EVERY 5 MIN PRN
Status: DISCONTINUED | OUTPATIENT
Start: 2023-10-19 | End: 2023-10-19

## 2023-10-19 RX ORDER — CEFAZOLIN SODIUM/WATER 2 G/20 ML
2 SYRINGE (ML) INTRAVENOUS ONCE
Status: COMPLETED | OUTPATIENT
Start: 2023-10-19 | End: 2023-10-19

## 2023-10-19 RX ORDER — ONDANSETRON 2 MG/ML
INJECTION INTRAMUSCULAR; INTRAVENOUS AS NEEDED
Status: DISCONTINUED | OUTPATIENT
Start: 2023-10-19 | End: 2023-10-19 | Stop reason: SURG

## 2023-10-19 RX ORDER — MIDAZOLAM HYDROCHLORIDE 1 MG/ML
INJECTION INTRAMUSCULAR; INTRAVENOUS AS NEEDED
Status: DISCONTINUED | OUTPATIENT
Start: 2023-10-19 | End: 2023-10-19 | Stop reason: SURG

## 2023-10-19 RX ORDER — HYDROCODONE BITARTRATE AND ACETAMINOPHEN 5; 325 MG/1; MG/1
1-2 TABLET ORAL EVERY 6 HOURS PRN
Qty: 15 TABLET | Refills: 0 | Status: SHIPPED | OUTPATIENT
Start: 2023-10-19

## 2023-10-19 RX ORDER — SODIUM CHLORIDE, SODIUM LACTATE, POTASSIUM CHLORIDE, CALCIUM CHLORIDE 600; 310; 30; 20 MG/100ML; MG/100ML; MG/100ML; MG/100ML
INJECTION, SOLUTION INTRAVENOUS CONTINUOUS
Status: DISCONTINUED | OUTPATIENT
Start: 2023-10-19 | End: 2023-10-19

## 2023-10-19 RX ORDER — DEXAMETHASONE SODIUM PHOSPHATE 4 MG/ML
VIAL (ML) INJECTION AS NEEDED
Status: DISCONTINUED | OUTPATIENT
Start: 2023-10-19 | End: 2023-10-19 | Stop reason: SURG

## 2023-10-19 RX ORDER — HYDROMORPHONE HYDROCHLORIDE 1 MG/ML
0.6 INJECTION, SOLUTION INTRAMUSCULAR; INTRAVENOUS; SUBCUTANEOUS EVERY 5 MIN PRN
Status: DISCONTINUED | OUTPATIENT
Start: 2023-10-19 | End: 2023-10-19

## 2023-10-19 RX ORDER — KETOROLAC TROMETHAMINE 30 MG/ML
INJECTION, SOLUTION INTRAMUSCULAR; INTRAVENOUS AS NEEDED
Status: DISCONTINUED | OUTPATIENT
Start: 2023-10-19 | End: 2023-10-19 | Stop reason: SURG

## 2023-10-19 RX ORDER — LIDOCAINE HYDROCHLORIDE 10 MG/ML
INJECTION, SOLUTION EPIDURAL; INFILTRATION; INTRACAUDAL; PERINEURAL AS NEEDED
Status: DISCONTINUED | OUTPATIENT
Start: 2023-10-19 | End: 2023-10-19 | Stop reason: SURG

## 2023-10-19 RX ORDER — BUPIVACAINE HYDROCHLORIDE AND EPINEPHRINE 2.5; 5 MG/ML; UG/ML
INJECTION, SOLUTION INFILTRATION; PERINEURAL AS NEEDED
Status: DISCONTINUED | OUTPATIENT
Start: 2023-10-19 | End: 2023-10-19 | Stop reason: HOSPADM

## 2023-10-19 RX ORDER — MORPHINE SULFATE 10 MG/ML
6 INJECTION, SOLUTION INTRAMUSCULAR; INTRAVENOUS EVERY 10 MIN PRN
Status: DISCONTINUED | OUTPATIENT
Start: 2023-10-19 | End: 2023-10-19

## 2023-10-19 RX ORDER — MORPHINE SULFATE 4 MG/ML
4 INJECTION, SOLUTION INTRAMUSCULAR; INTRAVENOUS EVERY 10 MIN PRN
Status: DISCONTINUED | OUTPATIENT
Start: 2023-10-19 | End: 2023-10-19

## 2023-10-19 RX ORDER — HYDROMORPHONE HYDROCHLORIDE 1 MG/ML
0.2 INJECTION, SOLUTION INTRAMUSCULAR; INTRAVENOUS; SUBCUTANEOUS EVERY 5 MIN PRN
Status: DISCONTINUED | OUTPATIENT
Start: 2023-10-19 | End: 2023-10-19

## 2023-10-19 RX ADMIN — MIDAZOLAM HYDROCHLORIDE 2 MG: 1 INJECTION INTRAMUSCULAR; INTRAVENOUS at 12:26:00

## 2023-10-19 RX ADMIN — SODIUM CHLORIDE, SODIUM LACTATE, POTASSIUM CHLORIDE, CALCIUM CHLORIDE: 600; 310; 30; 20 INJECTION, SOLUTION INTRAVENOUS at 12:25:00

## 2023-10-19 RX ADMIN — LIDOCAINE HYDROCHLORIDE 50 MG: 10 INJECTION, SOLUTION EPIDURAL; INFILTRATION; INTRACAUDAL; PERINEURAL at 12:29:00

## 2023-10-19 RX ADMIN — KETOROLAC TROMETHAMINE 30 MG: 30 INJECTION, SOLUTION INTRAMUSCULAR; INTRAVENOUS at 13:04:00

## 2023-10-19 RX ADMIN — CEFAZOLIN SODIUM/WATER 2 G: 2 G/20 ML SYRINGE (ML) INTRAVENOUS at 12:35:00

## 2023-10-19 RX ADMIN — ONDANSETRON 4 MG: 2 INJECTION INTRAMUSCULAR; INTRAVENOUS at 13:04:00

## 2023-10-19 RX ADMIN — SODIUM CHLORIDE, SODIUM LACTATE, POTASSIUM CHLORIDE, CALCIUM CHLORIDE: 600; 310; 30; 20 INJECTION, SOLUTION INTRAVENOUS at 13:18:00

## 2023-10-19 RX ADMIN — DEXAMETHASONE SODIUM PHOSPHATE 8 MG: 4 MG/ML VIAL (ML) INJECTION at 12:39:00

## 2023-10-19 NOTE — INTERVAL H&P NOTE
Pre-op Diagnosis: Stress incontinence    The above referenced H&P was reviewed by Annetta Christina MD on 10/19/2023, the patient was examined and no significant changes have occurred in the patient's condition since the H&P was performed. I discussed with the patient and/or legal representative the potential benefits, risks and side effects of this procedure; the likelihood of the patient achieving goals; and potential problems that might occur during recuperation. I discussed reasonable alternatives to the procedure, including risks, benefits and side effects related to the alternatives and risks related to not receiving this procedure. We will proceed with procedure as planned.

## 2023-10-19 NOTE — OPERATIVE REPORT
Dee Dee Rodriges  : 1980  MRN: [id number]  Date of Surgery: 10/19/2023     Pre-operative diagnosis:    1. Stress urinary incontinence      Post-operative diagnosis:  1. Stress urinary incontinence      Procedure:   1. Retropubic midurethral sling (Advantage Fit)  2. Cystoscopy. Surgeon: Elizabeth Guevara MD     Assistant: JASON Kebede           Location: [place] [address]     Anesthesia: General, via LMA     EBL:  25 cc     Complications:  None     Drains: None     Specimens: None     Findings: Normal bladder and urethra. Procedure: The patient was taken to Operating Room 16, where she was identified and general anesthesia was introduced. She was placed in dorsal lithotomy in [address] [place] [address]. Surgical time out was performed. She was prepped and draped in normal sterile fashion. The bladder was drained. The midurethral area was identified and 0.25% Marcaine with epinephrine was injected in the periurethral area bilaterally. A 2-cm incision was made in the midurethral area. The vaginal epithelium was dissected off the underlying periurethral tissue. The Advantage fit trocar was passed from the vaginal side to the retropubic side bilaterally. Exit markings had been made on the skin previously. Cystoscopy was performed with a 70-degree scope. There were no intravesical lesions, foreign body or bladder perforation. The urethra was normal. The sling was then placed in the midurethra in a tension free fashion. There was no evidence of tunneling of the vaginal mucosa. The excess sling was trimmed. The vaginal mucosa was closed in the midline with running 2-0 Vicryl suture. The suprapubic stab incisions were reapproximated with skin glue. Hemostasis was assured. EBL was 25 cc. Anesthesia was reversed. There were no complications.       Estefani Hamilton MD

## 2023-10-19 NOTE — ANESTHESIA PROCEDURE NOTES
Airway  Date/Time: 10/19/2023 12:30 PM  Urgency: Elective    Airway not difficult    General Information and Staff    Patient location during procedure: OR  Anesthesiologist: Luís Baker MD  Performed: anesthesiologist   Performed by: Luís Baker MD  Authorized by: Luís Baker MD      Indications and Patient Condition  Indications for airway management: anesthesia  Spontaneous Ventilation: absent  Sedation level: deep  Preoxygenated: yes  Patient position: sniffing  MILS maintained throughout  Mask difficulty assessment: 1 - vent by mask  Planned trial extubation    Final Airway Details  Final airway type: supraglottic airway      Successful airway: classic  Size 4       Number of attempts at approach: 1  Number of other approaches attempted: 0    Additional Comments  LMA placed easily first attempt, no dental or soft tissue damage. No signs of aspiration.

## 2023-12-05 ENCOUNTER — OFFICE VISIT (OUTPATIENT)
Dept: UROLOGY | Facility: HOSPITAL | Age: 43
End: 2023-12-05
Attending: OBSTETRICS & GYNECOLOGY
Payer: COMMERCIAL

## 2023-12-05 VITALS
DIASTOLIC BLOOD PRESSURE: 62 MMHG | SYSTOLIC BLOOD PRESSURE: 100 MMHG | HEIGHT: 63 IN | TEMPERATURE: 98 F | WEIGHT: 154 LBS | BODY MASS INDEX: 27.29 KG/M2 | RESPIRATION RATE: 18 BRPM

## 2023-12-05 DIAGNOSIS — R30.0 DYSURIA: Primary | ICD-10-CM

## 2023-12-05 PROCEDURE — 81002 URINALYSIS NONAUTO W/O SCOPE: CPT

## 2023-12-05 PROCEDURE — 81002 URINALYSIS NONAUTO W/O SCOPE: CPT | Performed by: OBSTETRICS & GYNECOLOGY

## 2023-12-05 PROCEDURE — 99212 OFFICE O/P EST SF 10 MIN: CPT

## 2023-12-05 PROCEDURE — 87086 URINE CULTURE/COLONY COUNT: CPT | Performed by: OBSTETRICS & GYNECOLOGY

## 2023-12-07 ENCOUNTER — TELEPHONE (OUTPATIENT)
Dept: UROLOGY | Facility: HOSPITAL | Age: 43
End: 2023-12-07

## 2023-12-07 NOTE — TELEPHONE ENCOUNTER
Phoned patient using  [employer] #[id number]. Left detailed message informing pt of normal urine culture results. Encouraged to call our office back at [phone] with any questions or concerns.

## 2024-08-09 ENCOUNTER — HOSPITAL ENCOUNTER (OUTPATIENT)
Dept: MAMMOGRAPHY | Age: 44
End: 2024-08-09
Attending: FAMILY MEDICINE
Payer: COMMERCIAL

## 2024-08-09 ENCOUNTER — HOSPITAL ENCOUNTER (OUTPATIENT)
Dept: MAMMOGRAPHY | Age: 44
Discharge: HOME | End: 2024-08-09
Attending: FAMILY MEDICINE
Payer: COMMERCIAL

## 2024-08-09 DIAGNOSIS — Z12.31 ENCOUNTER FOR SCREENING MAMMOGRAM FOR MALIGNANT NEOPLASM OF BREAST: ICD-10-CM

## 2024-08-09 DIAGNOSIS — Z12.31 VISIT FOR SCREENING MAMMOGRAM: ICD-10-CM

## 2024-08-09 PROCEDURE — 77067 SCR MAMMO BI INCL CAD: CPT | Performed by: FAMILY MEDICINE

## 2024-08-09 PROCEDURE — 77063 BREAST TOMOSYNTHESIS BI: CPT | Performed by: FAMILY MEDICINE

## 2024-10-30 NOTE — H&P
The patient verbally consents to a Virtual/Telephone Check-In visit on 11/04/24.     Patient understands and accepts financial responsibility for any deductible, co-insurance and/or co-pays associated with this service.     Duration of the service: 30 minutes       [facility] - Gastroenterology                                                                                                               Reason for consult: eval    Requesting physician or provider: DOUGIE DALY MD    Chief Complaint   Patient presents with    Colonoscopy Screening       HPI:   Parisa Lugo is a 44 year old year-old female without significant Pmhx:    she is here today for evaluation prior to c-scope  She is [place] citizen speaking and an  was used for today's visit    she reports chronic constipation and hemorrhoids. H/o hemorrhoidectomy 6 years ago, however have returned.  She moves her bowels every 2 days.  She has straining. She describes brbpr w/ bm. No clots. No melena.   Has mild rectal pain with sx. No fever/chills. No mucus, pus discharge.     she has had acid reflux x last year. she denies dysphagia, odynophagia and/or globus. she denies abdominal pain. she denies nausea and/or vomiting.  she denies recent change in appetite and/or unintentional weight loss.    NSAIDS: no  Tobacco: no  Alcohol: no  Marijuana: no  Illicit drugs: no    No FH GI malignancy, IBD, celiac    No history of adverse reaction to sedation  No HARINDER  No anticoagulants  No pacemaker/defibrillator  No pain medications and/or sleep aides      Last colonoscopy: no  Last EGD: no    Wt Readings from Last 6 Encounters:   12/05/23 154 lb (69.9 kg)   10/19/23 154 lb (69.9 kg)   09/19/23 153 lb (69.4 kg)   08/21/23 153 lb (69.4 kg)   03/08/21 153 lb (69.4 kg)   11/19/20 154 lb (69.9 kg)        History, Medications, Allergies, ROS:      No past medical history on file.   Past Surgical History:   Procedure Laterality Date    Correct bunion,simple Left 2011     Excision turbinate,submucous  2018    Hemorrhoidectomy  10/04/2018     x 2    Repair of nasal septum  2018    Umbilical hernia repair        Family Hx:   Family History   Problem Relation Age of Onset    Other (Other) Father          work accident 25 yrs ago      Social History:   Social History     Socioeconomic History    Marital status:     Number of children: 2   Occupational History    Occupation:    Tobacco Use    Smoking status: Never    Smokeless tobacco: Never   Vaping Use    Vaping status: Never Used   Substance and Sexual Activity    Alcohol use: No    Drug use: No   Social History Narrative    ** Merged History Encounter **          Social Drivers of Health      [facility]    Housing Stability        Medications (Active prior to today's visit):  Current Outpatient Medications   Medication Sig Dispense Refill    PEG 3350-KCl-Na Bicarb-NaCl (TRILYTE) 420 g Oral Recon Soln Take prep as directed by gastro office. May substitute with Trilyte/generic equivalent if needed. 4000 mL 0    hydrocortisone 2.5 % External Cream Place 1 Application rectally 2 (two) times daily for 14 days. Apply by topical route 2 times every day to the affected area(s) 30 g 0    HYDROcodone-acetaminophen 5-325 MG Oral Tab Take 1-2 tablets by mouth every 6 (six) hours as needed for Pain. (Patient not taking: Reported on 2023) 15 tablet 0       Allergies:  Allergies[1]    ROS:   CONSTITUTIONAL: negative for fevers, chills, sweats and weight loss  EYES Negative for red eyes, yellow eyes, changes in vision  HEENT: Negative for dysphagia and hoarseness  RESPIRATORY: Negative for cough and shortness of breath  CARDIOVASCULAR: Negative for chest pain, palpitations  GASTROINTESTINAL: See HPI  GENITOURINARY: Negative for dysuria and frequency  MUSCULOSKELETAL: Negative for arthralgias and myalgias  NEUROLOGICAL: Negative for  dizziness and headaches  BEHAVIOR/PSYCH: Negative for anxiety and poor appetite    PHYSICAL EXAM:   Limited 2/2 telehealth    GEN: WD/WN, NAD    LUNGS: No increased work of breathing    NEURO: Alert and interactive, normal gait    Labs/Imaging/Procedures:     Patient's pertinent labs and imaging were reviewed and discussed with patient today.        .  ASSESSMENT/PLAN:   Parisa Lugo is a 44 year old year-old female without significant Pmhx:    #constipation  #brbpr  #hemorrhoids  Chronic constipation w/ associated brbpr attributed to known hemorrhoids. H/o hemorrhoidectomy 6 y ago, but has had recurrence of sx over the years.  No fhx gi malignancy, IBD. Has not had cln.  Plan for procedure for avg risk screening.    #gerd  Sx x last year. Think unmanaged constipation may be contributing to complaints. No dysphagia, vomiting, melena.  Recommendations below and ctm for need for egd.     -anusol cream twice daily x 2 weeks for hemorrhoids  -sitzbaths NO SALT  -squatty potty  -labs  -hpylori testing  -reflux diet modification  -pepcid as needed  -miralax 1 capful daily x 7 days and adjust as needed  -fibercon or citrucel  -er if condition decline    1. Schedule colonoscopy with IV or MAC w/ general pool MD [Diagnosis: crc screening]    2.  bowel prep from pharmacy (split trilyte -Buy over the counter dulcolax laxative, and take one tablet daily for 3 days prior to drinking the bowel prep.   )    3.   For cardiology patients and patients on blood thinners:  Please contact your cardiology clinic for clearance to proceed with the endoscopic procedure. If you are on blood thinners, please also confirm with your cardiologic clinic that you are able to hold the blood thinner per our recommendations.\"    BLOOD THINNER ORDERS:  -Hold for 48 hours (Xarelto, Eliquis, Pradaxa, Savaysa)  -Hold for 3 days (Pletal)  -Hold for 5 days (Coumadin, Plavix, Brilinta, Aggrenox)  -Hold for 7 days (Effient)     For endocrinology insulin  patients:    Please contact your endocrinology clinic for insulin adjustment orders prior to your endoscopic procedure.    4. Read all bowel prep instructions carefully. Bowel prep instructions can also be found online at:  www.eehealth.org/giprep     5. AVOID seeds, nuts, popcorn, raw fruits and vegetables for 3 days before procedure    6.  If you start any NEW medication after your visit today, please notify us. Certain medications (like iron or weight loss medications) will need to be held before the procedure, or the procedure cannot be performed safely.        Orders This Visit:  Orders Placed This Encounter   Procedures    CBC W Differential W Platelet    TSH W Reflex To Free T4    Comp Metabolic Panel (14)    Helicobacter Pylori Breath Test, Adult       Meds This Visit:  Requested Prescriptions     Signed Prescriptions Disp Refills    PEG 3350-KCl-Na Bicarb-NaCl (TRILYTE) 420 g Oral Recon Soln 4000 mL 0     Sig: Take prep as directed by gastro office. May substitute with Trilyte/generic equivalent if needed.    hydrocortisone 2.5 % External Cream 30 g 0     Sig: Place 1 Application rectally 2 (two) times daily for 14 days. Apply by topical route 2 times every day to the affected area(s)       Imaging & Referrals:  None    ENDOSCOPIC RISK BENEFIT DISCUSSION: I described the procedure in great detail with the patient. I discussed the risks and benefits, including but not limited to: bleeding, perforation, infection, anesthesia complications, and even death. Patient will be NPO after midnight and will have a person physically present at time of pick-up to drive patient home. Patient verbalized understanding and agrees to proceed with procedure as planned.      Sussy Gaspar, APRN   11/4/2024        This note was partially prepared using Dragon Medical voice recognition dictation software. As a result, errors may occur. When identified, these errors have been corrected. While every attempt is made to  correct errors during dictation, discrepancies may still exist.          [1]   Allergies  Allergen Reactions    Pollen Runny nose

## 2024-11-04 ENCOUNTER — TELEPHONE (OUTPATIENT)
Dept: GASTROENTEROLOGY | Facility: CLINIC | Age: 44
End: 2024-11-04

## 2024-11-04 ENCOUNTER — VIRTUAL PHONE E/M (OUTPATIENT)
Facility: CLINIC | Age: 44
End: 2024-11-04
Payer: COMMERCIAL

## 2024-11-04 DIAGNOSIS — K21.9 GASTROESOPHAGEAL REFLUX DISEASE, UNSPECIFIED WHETHER ESOPHAGITIS PRESENT: ICD-10-CM

## 2024-11-04 DIAGNOSIS — Z12.11 SCREEN FOR COLON CANCER: Primary | ICD-10-CM

## 2024-11-04 DIAGNOSIS — K59.00 CONSTIPATION, UNSPECIFIED CONSTIPATION TYPE: ICD-10-CM

## 2024-11-04 DIAGNOSIS — K62.5 BRBPR (BRIGHT RED BLOOD PER RECTUM): Primary | ICD-10-CM

## 2024-11-04 DIAGNOSIS — K64.9 HEMORRHOIDS, UNSPECIFIED HEMORRHOID TYPE: ICD-10-CM

## 2024-11-04 PROCEDURE — 99204 OFFICE O/P NEW MOD 45 MIN: CPT | Performed by: NURSE PRACTITIONER

## 2024-11-04 RX ORDER — HYDROCORTISONE 25 MG/G
1 CREAM TOPICAL 2 TIMES DAILY
Qty: 30 G | Refills: 0 | Status: SHIPPED | OUTPATIENT
Start: 2024-11-04 | End: 2024-11-18

## 2024-11-04 RX ORDER — POLYETHYLENE GLYCOL 3350, SODIUM CHLORIDE, SODIUM BICARBONATE, POTASSIUM CHLORIDE 420; 11.2; 5.72; 1.48 G/4L; G/4L; G/4L; G/4L
POWDER, FOR SOLUTION ORAL
Qty: 4000 ML | Refills: 0 | Status: SHIPPED | OUTPATIENT
Start: 2024-11-04

## 2024-11-04 NOTE — PATIENT INSTRUCTIONS
-anusol cream twice daily x 2 weeks for hemorrhoids  -sitzbaths NO SALT  -squatty potty  -labs  -hpylori testing  -reflux diet modification  -pepcid as needed  -miralax 1 capful daily x 7 days and adjust as needed  -fibercon or citrucel  -er if condition decline    1. Schedule colonoscopy with IV or MAC w/ general pool MD [Diagnosis: crc screening]    2.  bowel prep from pharmacy (split trilyte -Buy over the counter dulcolax laxative, and take one tablet daily for 3 days prior to drinking the bowel prep.   )    3.   For cardiology patients and patients on blood thinners:  Please contact your cardiology clinic for clearance to proceed with the endoscopic procedure. If you are on blood thinners, please also confirm with your cardiologic clinic that you are able to hold the blood thinner per our recommendations.\"    BLOOD THINNER ORDERS:  -Hold for 48 hours (Xarelto, Eliquis, Pradaxa, Savaysa)  -Hold for 3 days (Pletal)  -Hold for 5 days (Coumadin, Plavix, Brilinta, Aggrenox)  -Hold for 7 days (Effient)     For endocrinology insulin patients:    Please contact your endocrinology clinic for insulin adjustment orders prior to your endoscopic procedure.    4. Read all bowel prep instructions carefully. Bowel prep instructions can also be found online at:  www.eehealth.org/giprep     5. AVOID seeds, nuts, popcorn, raw fruits and vegetables for 3 days before procedure    6.  If you start any NEW medication after your visit today, please notify us. Certain medications (like iron or weight loss medications) will need to be held before the procedure, or the procedure cannot be performed safely.            Consejos para controlar el reflujo de ácido (agruras)   Para controlar el reflujo de ácido es necesario hacer algunos cambios básicos en la alimentación y el estilo de maria del carmen. Los siguientes consejos pueden ser suficientes para aliviar el malestar.   Preste atención a lo que come  No ingiera comidas grasosas o muy  condimentadas.  Coma menos alimentos ácidos, abhi cítricos y alimentos a base de tomates, ya que pueden agravar los síntomas.  Limite el consumo de alcohol, cafeína y bebidas gaseosas. Todas estas bebidas aumentan el reflujo.  Intente limitar el consumo de chocolate, menta y hierbabuena. Perla puede empeorar el reflujo de ácido en algunas personas.     Vigile cuándo come  No se acueste brendan 3 horas después de maciel comido.  No coma nada antes de irse a la cama.     Mantenga la fay levantada  Mantener la fay y el pecho elevados unas 4 a 6 pulgadas lo ayudará a aliviar el reflujo cuando esté acostado. Ponga soportes debajo de la cabecera de la cama o katherine cuña debajo del colchón para elevarlos.     Otros cambios  Baje de peso, si es necesario.  No kevin ejercicio poco antes de acostarse.  No use ropa ajustada.  Limite el uso de aspirina e ibuprofeno.  Deje de fumar.        © 2883-1007 [employer]. Todos los derechos reservados. Esta información no pretende sustituir la atención médica profesional. Sólo pandey médico puede diagnosticar y tratar un problema de michelle.

## 2024-11-04 NOTE — TELEPHONE ENCOUNTER
1. Schedule colonoscopy with IV or MAC sonny/ general pool MD [Diagnosis: crc screening]    2.  bowel prep from pharmacy (split trilyte -Buy over the counter dulcolax laxative, and take one tablet daily for 3 days prior to drinking the bowel prep.   )    3.   For cardiology patients and patients on blood thinners:  Please contact your cardiology clinic for clearance to proceed with the endoscopic procedure. If you are on blood thinners, please also confirm with your cardiologic clinic that you are able to hold the blood thinner per our recommendations.\"    BLOOD THINNER ORDERS:  -Hold for 48 hours (Xarelto, Eliquis, Pradaxa, Savaysa)  -Hold for 3 days (Pletal)  -Hold for 5 days (Coumadin, Plavix, Brilinta, Aggrenox)  -Hold for 7 days (Effient)     For endocrinology insulin patients:    Please contact your endocrinology clinic for insulin adjustment orders prior to your endoscopic procedure.    4. Read all bowel prep instructions carefully. Bowel prep instructions can also be found online at:  www.eehealth.org/giprep     5. AVOID seeds, nuts, popcorn, raw fruits and vegetables for 3 days before procedure    6.  If you start any NEW medication after your visit today, please notify us. Certain medications (like iron or weight loss medications) will need to be held before the procedure, or the procedure cannot be performed safely.

## 2024-11-05 NOTE — TELEPHONE ENCOUNTER
Scheduled for:  Colonoscopy [id number]  Provider Name: Dr. Merida   Date:  12/14/2024  Location:    [facility]  Sedation:  IV  Time:  11:30 (pt is aware that EM will call the day before to confirm arrival time)  Prep:  trilyte - Buy over the counter dulcolax laxative, and take one tablet daily for 3 days prior to drinking the bowel prep.   Meds/Allergies Reconciled?:  Physician reviewed   Diagnosis with codes:  Colon cancer screening Z12.11  Was patient informed to call insurance with codes (Y/N):  Yes, I confirmed [employer]  insurance with the patient.   Referral sent?:  Referral was sent at the time of electronic surgical scheduling.  EM or [facility] notified?:  I sent an electronic request to Endo Scheduling and received a confirmation today.   Medication Orders:  This patient verbally confirmed that she is not taking:   Iron, blood thinners, BP meds, and is not diabetic   Not latex allergy, Not PCN allergy and does not have a pacemaker  Misc Orders:  I discussed the prep instructions with the patient which she verbally understood and is aware that I will mychart the instructions today.       Further instructions given by staff:

## 2024-11-30 ENCOUNTER — LAB ENCOUNTER (OUTPATIENT)
Dept: LAB | Age: 44
End: 2024-11-30
Attending: NURSE PRACTITIONER
Payer: COMMERCIAL

## 2024-11-30 DIAGNOSIS — K21.9 GASTROESOPHAGEAL REFLUX DISEASE, UNSPECIFIED WHETHER ESOPHAGITIS PRESENT: ICD-10-CM

## 2024-11-30 DIAGNOSIS — K59.00 CONSTIPATION, UNSPECIFIED CONSTIPATION TYPE: ICD-10-CM

## 2024-11-30 DIAGNOSIS — K64.9 HEMORRHOIDS, UNSPECIFIED HEMORRHOID TYPE: ICD-10-CM

## 2024-11-30 DIAGNOSIS — K62.5 BRBPR (BRIGHT RED BLOOD PER RECTUM): ICD-10-CM

## 2024-11-30 LAB
ALBUMIN SERPL-MCNC: 4.7 G/DL (ref 3.2–4.8)
ALBUMIN/GLOB SERPL: 2 {RATIO} (ref 1–2)
ALP LIVER SERPL-CCNC: 72 U/L
ALT SERPL-CCNC: 15 U/L
ANION GAP SERPL CALC-SCNC: 7 MMOL/L (ref 0–18)
AST SERPL-CCNC: 15 U/L (ref ?–34)
BASOPHILS # BLD AUTO: 0.08 X10(3) UL (ref 0–0.2)
BASOPHILS NFR BLD AUTO: 1.4 %
BILIRUB SERPL-MCNC: 1 MG/DL (ref 0.3–1.2)
BUN BLD-MCNC: 8 MG/DL (ref 9–23)
BUN/CREAT SERPL: 13.6 (ref 10–20)
CALCIUM BLD-MCNC: 9.5 MG/DL (ref 8.7–10.4)
CHLORIDE SERPL-SCNC: 109 MMOL/L (ref 98–112)
CO2 SERPL-SCNC: 24 MMOL/L (ref 21–32)
CREAT BLD-MCNC: 0.59 MG/DL
DEPRECATED RDW RBC AUTO: 47.2 FL (ref 35.1–46.3)
EGFRCR SERPLBLD CKD-EPI 2021: 112 ML/MIN/1.73M2 (ref 60–?)
EOSINOPHIL # BLD AUTO: 0.15 X10(3) UL (ref 0–0.7)
EOSINOPHIL NFR BLD AUTO: 2.7 %
ERYTHROCYTE [DISTWIDTH] IN BLOOD BY AUTOMATED COUNT: 14.7 % (ref 11–15)
FASTING STATUS PATIENT QL REPORTED: YES
GLOBULIN PLAS-MCNC: 2.4 G/DL (ref 2–3.5)
GLUCOSE BLD-MCNC: 88 MG/DL (ref 70–99)
HCT VFR BLD AUTO: 37.9 %
HGB BLD-MCNC: 12.8 G/DL
IMM GRANULOCYTES # BLD AUTO: 0.01 X10(3) UL (ref 0–1)
IMM GRANULOCYTES NFR BLD: 0.2 %
LYMPHOCYTES # BLD AUTO: 2.03 X10(3) UL (ref 1–4)
LYMPHOCYTES NFR BLD AUTO: 36.6 %
MCH RBC QN AUTO: 29.9 PG (ref 26–34)
MCHC RBC AUTO-ENTMCNC: 33.8 G/DL (ref 31–37)
MCV RBC AUTO: 88.6 FL
MONOCYTES # BLD AUTO: 0.42 X10(3) UL (ref 0.1–1)
MONOCYTES NFR BLD AUTO: 7.6 %
NEUTROPHILS # BLD AUTO: 2.86 X10 (3) UL (ref 1.5–7.7)
NEUTROPHILS # BLD AUTO: 2.86 X10(3) UL (ref 1.5–7.7)
NEUTROPHILS NFR BLD AUTO: 51.5 %
OSMOLALITY SERPL CALC.SUM OF ELEC: 288 MOSM/KG (ref 275–295)
PLATELET # BLD AUTO: 226 10(3)UL (ref 150–450)
POTASSIUM SERPL-SCNC: 4 MMOL/L (ref 3.5–5.1)
PROT SERPL-MCNC: 7.1 G/DL (ref 5.7–8.2)
RBC # BLD AUTO: 4.28 X10(6)UL
SODIUM SERPL-SCNC: 140 MMOL/L (ref 136–145)
TSI SER-ACNC: 0.8 UIU/ML (ref 0.55–4.78)
WBC # BLD AUTO: 5.6 X10(3) UL (ref 4–11)

## 2024-11-30 PROCEDURE — 85025 COMPLETE CBC W/AUTO DIFF WBC: CPT

## 2024-11-30 PROCEDURE — 36415 COLL VENOUS BLD VENIPUNCTURE: CPT

## 2024-11-30 PROCEDURE — 80053 COMPREHEN METABOLIC PANEL: CPT

## 2024-11-30 PROCEDURE — 84443 ASSAY THYROID STIM HORMONE: CPT

## 2024-11-30 PROCEDURE — 83013 H PYLORI (C-13) BREATH: CPT

## 2024-12-03 ENCOUNTER — TELEPHONE (OUTPATIENT)
Dept: GASTROENTEROLOGY | Facility: CLINIC | Age: 44
End: 2024-12-03

## 2024-12-03 DIAGNOSIS — A04.8 HELICOBACTER PYLORI (H. PYLORI) INFECTION: Primary | ICD-10-CM

## 2024-12-03 LAB — H PYLORI BREATH TEST: POSITIVE

## 2024-12-03 RX ORDER — AMOXICILLIN 500 MG/1
1000 TABLET, FILM COATED ORAL 2 TIMES DAILY
Qty: 56 TABLET | Refills: 0 | Status: SHIPPED | OUTPATIENT
Start: 2024-12-03 | End: 2024-12-17

## 2024-12-03 RX ORDER — CLARITHROMYCIN 500 MG/1
500 TABLET ORAL 2 TIMES DAILY
Qty: 28 TABLET | Refills: 0 | Status: SHIPPED | OUTPATIENT
Start: 2024-12-03 | End: 2024-12-17

## 2024-12-03 NOTE — TELEPHONE ENCOUNTER
Component  Ref Range & Units 12/3/24     H pylori Breath Test  Negative Positive Abnormal      I contacted the pt with the above results. No answer. I left a detailed message with her results and the below information:    H.pylori gastric infection identified on a test. The natural history of H.pylori was reviewed with the patient, as well as possible complications from H.pylori including stomach cancer, gastritis, dyspepsia, anemia and ulcers. We discussed treatment options and rationale for treatment, as well as testing for eradication.       Plan:  Will start w/triple therapy (PPI + amoxicillin + clarithromycin) x 14 days.   I have instructed patient to check repeat H.pylori test at least 4 weeks after finishing treatment to ensure eradication (order placed).

## 2024-12-04 ENCOUNTER — TELEPHONE (OUTPATIENT)
Facility: CLINIC | Age: 44
End: 2024-12-04

## 2024-12-06 NOTE — TELEPHONE ENCOUNTER
It will not interfere.  She should continue treatment.  Thanks,  C  
[employer] Interpretor #[id number] used to call patient    Patient did not answer, left message for patient to call back  
[employer] Interpretor #[id number] used to call patient    Patient made aware to continue medication regimen for H. Pylori treatment until finished    Patient verbalized understanding and has no further questions at this time  
Patient called with questions about H Pylori prescription and upcoming colonoscopy.  Please call.    
Sussy-    [employer] Mobile Interpretor # [phone] used to call patient    I spoke to the patient    Patient stated that she began her H. Pylori medication regimen yesterday and is aware this treatment lasts for 14 days. With that being said, patient mentioned that she is concerned that her colonoscopy (scheduled on 12/14/2024) will interfere with her treatment due to colon prep/procedure    Patient is wondering if she should continue her treatment or wait until procedure is over    Please advise    Thank you  
Improved

## 2024-12-14 ENCOUNTER — HOSPITAL ENCOUNTER (OUTPATIENT)
Facility: HOSPITAL | Age: 44
Setting detail: HOSPITAL OUTPATIENT SURGERY
Discharge: HOME | End: 2024-12-14
Attending: INTERNAL MEDICINE | Admitting: INTERNAL MEDICINE
Payer: COMMERCIAL

## 2024-12-14 VITALS
OXYGEN SATURATION: 98 % | WEIGHT: 156 LBS | HEIGHT: 63 IN | SYSTOLIC BLOOD PRESSURE: 128 MMHG | RESPIRATION RATE: 18 BRPM | DIASTOLIC BLOOD PRESSURE: 74 MMHG | BODY MASS INDEX: 27.64 KG/M2 | HEART RATE: 87 BPM | TEMPERATURE: 98 F

## 2024-12-14 DIAGNOSIS — Z12.11 SCREEN FOR COLON CANCER: ICD-10-CM

## 2024-12-14 PROBLEM — K64.8 INTERNAL HEMORRHOIDS: Status: ACTIVE | Noted: 2018-09-13

## 2024-12-14 LAB — B-HCG UR QL: NEGATIVE

## 2024-12-14 PROCEDURE — 0DJD8ZZ INSPECTION OF LOWER INTESTINAL TRACT, VIA NATURAL OR ARTIFICIAL OPENING ENDOSCOPIC: ICD-10-PCS | Performed by: INTERNAL MEDICINE

## 2024-12-14 PROCEDURE — 45378 DIAGNOSTIC COLONOSCOPY: CPT | Performed by: INTERNAL MEDICINE

## 2024-12-14 PROCEDURE — G0500 MOD SEDAT ENDO SERVICE >5YRS: HCPCS | Performed by: INTERNAL MEDICINE

## 2024-12-14 RX ORDER — MIDAZOLAM HYDROCHLORIDE 1 MG/ML
INJECTION INTRAMUSCULAR; INTRAVENOUS
Status: DISCONTINUED | OUTPATIENT
Start: 2024-12-14 | End: 2024-12-14

## 2024-12-14 RX ORDER — SODIUM CHLORIDE, SODIUM LACTATE, POTASSIUM CHLORIDE, CALCIUM CHLORIDE 600; 310; 30; 20 MG/100ML; MG/100ML; MG/100ML; MG/100ML
INJECTION, SOLUTION INTRAVENOUS CONTINUOUS
Status: DISCONTINUED | OUTPATIENT
Start: 2024-12-14 | End: 2024-12-14

## 2024-12-14 NOTE — H&P
History & Physical Examination    Patient Name: Parisa Lugo  MRN: [id number]  [facility]: [id number]  YOB: 1980    Diagnosis: screening for colon cancer    Prescriptions Prior to Admission[1]  Current Facility-Administered Medications   Medication Dose Route Frequency    lactated ringers infusion   Intravenous Continuous       Allergies: Allergies[2]    History reviewed. No pertinent past medical history.  Past Surgical History:   Procedure Laterality Date    Correct bunion,simple Left 2011    Excision turbinate,submucous  2018    Hemorrhoidectomy  10/04/2018     x 2    Repair of nasal septum  2018    Umbilical hernia repair       Family History   Problem Relation Age of Onset    Other (Other) Father          work accident 25 yrs ago     Social History     Tobacco Use    Smoking status: Never    Smokeless tobacco: Never   Substance Use Topics    Alcohol use: No       SYSTEM Check if Review is Normal Check if Physical Exam is Normal If not normal, please explain:   HEENT [X ] [ X]    NECK  [X ] [ X]    HEART [X ] [ X]    LUNGS [X ] [ X]    ABDOMEN [X ] [ X]    EXTREMITIES [X ] [ X]    OTHER        I have discussed the risks and benefits and alternatives of the procedure with the patient/family.  They understand and agree to proceed with plan of care.   I have reviewed the History and Physical done within the last 30 days.  Any changes noted above.    ANGEL Merida MD  [facility] - Gastroenterology  2024  11:49 AM                 [1]   Medications Prior to Admission   Medication Sig Dispense Refill Last Dose/Taking    amoxicillin 500 MG Oral Tab Take 2 tablets (1,000 mg total) by mouth 2 (two) times daily for 14 days. 56 tablet 0     clarithromycin 500 MG Oral Tab Take 1 tablet (500 mg total) by mouth 2 (two) times daily for 14 days. 28 tablet 0     omeprazole 20 MG Oral Capsule Delayed Release Take 1 capsule (20 mg total) by mouth 2 (two) times daily before meals for 14 days. 28  capsule 0     PEG 3350-KCl-Na Bicarb-NaCl (TRILYTE) 420 g Oral Recon Soln Take prep as directed by gastro office. May substitute with Trilyte/generic equivalent if needed. 4000 mL 0     [] hydrocortisone 2.5 % External Cream Place 1 Application rectally 2 (two) times daily for 14 days. Apply by topical route 2 times every day to the affected area(s) 30 g 0     HYDROcodone-acetaminophen 5-325 MG Oral Tab Take 1-2 tablets by mouth every 6 (six) hours as needed for Pain. 15 tablet 0    [2]   Allergies  Allergen Reactions    Pollen Runny nose

## 2024-12-14 NOTE — OPERATIVE REPORT
COLONOSCOPY REPORT    Parisa Lugo     1980 Age 44 year old   PCP DOUGIE DALY MD Endoscopist Ann Merida MD     Date of procedure: 24    Procedure: Colonoscopy     Pre-operative diagnosis: Screening    Post-operative diagnosis: Internal hemorrhoids    Medications: Versed 5 mg IV push.  Fentanyl 75 mcg IV push. [Per my order and under my supervision, the patient was sedated with intermittent intravenous doses of versed and fentanyl. The vital signs were monitored and recorded by an experienced RN. The procedure started after the patient was adequately sedated. Total time for moderate conscious sedation was 30 minutes].    Withdrawal time: 12 minutes    Procedure:  Informed consent was obtained from the patient after the risks of the procedure were discussed, including but not limited to bleeding, perforation, aspiration, infection, or possibility of a missed lesion. After discussions of the risks/benefits and alternatives to this procedure, as well as the planned sedation, the patient was placed in the left lateral decubitus position and begun on continuous blood pressure pulse oximetry and EKG monitoring and this was maintained throughout the procedure. Once an adequate level of sedation was obtained a digital rectal exam was completed. Then the lubricated tip of the [id number] diagnostic video colonoscope was inserted and advanced without difficulty to the cecum using the CO2 insufflation technique. The cecum was identified by localizing the trifold, the appendix and the ileocecal valve. Withdrawal was begun with thorough washing and careful examination of the colonic walls and folds. A routine second examination of the cecum/ascending colon was performed. Photodocumentation was obtained. The bowel prep was good. Views of the colon were good with washing. I then carefully withdrew the instrument from the patient who tolerated the procedure well.     Complications:  none.    Findings:   1. NO polyp(s) noted.    2. Diverticulosis: none.    3. Terminal ileum: the visualized mucosa appeared normal.    4. The colonic mucosa throughout the colon showed normal vascular pattern, without evidence of angioectasias or inflammation.     5. A retroflexed view of the rectum revealed small internal hemorrhoids.    6. RICKI: normal rectal tone, no masses palpated.     Impression:   Normal colonoscopy to the terminal ileum, other than small internal hemorrhoids.     Recommend:  Repeat CLN in 10 years. If new signs or symptoms develop, colonoscopy may need to be repeated sooner.   High fiber diet.  Monitor for blood in the stool. If having more than just tinge of blood, call office or go to the ER.  Miralax as needed to help with constipation.    Specimens: none  Blood loss: <1 ml   none

## 2024-12-14 NOTE — DISCHARGE INSTRUCTIONS
Home Care Instructions for Colonoscopy and/or Gastroscopy with Sedation    Diet:  - Resume your regular diet as tolerated unless otherwise instructed.  - Start with light meals to minimize bloating.  - Do not drink alcohol today.    Medication:  - If you have questions about resuming your normal medications, please contact your Primary Care Physician.    Activities:  - Take it easy today. Do not return to work today.  - Do not drive today.  - Do not operate any machinery today (including kitchen equipment).    Colonoscopy:  - You may notice some rectal \"spotting\" (a little blood on the toilet tissue) for a day or two after the exam. This is normal.  - If you experience any rectal bleeding (not spotting), persistent tenderness or sharp severe abdominal pains, oral temperature over 100 degrees Fahrenheit, light-headedness or dizziness, or any other problems, contact your doctor.    Gastroscopy:  - You may have a sore throat for 2-3 days following the exam. This is normal. Gargling with warm salt water (1/2 tsp salt to 1 glass warm water) or using throat lozenges will help.  - If you experience any sharp pain in your neck, abdomen or chest, vomiting of blood, oral temperature over 100 degrees Fahrenheit, light-headedness or dizziness, or any other problems, contact your doctor.    **If unable to reach your doctor, please go to the [facility] Emergency Room**    - Your referring physician will receive a full report of your examination.  - If you do not hear from your doctor's office within two weeks of your biopsy, please call them for your results.    You may be able to see your laboratory results in [facility] between 4 and 7 business days.  In some cases, your physician may not have viewed the results before they are released to [facility].  If you have questions regarding your results contact the physician who ordered the test/exam by phone or via [facility] by choosing \"Ask a Medical Question.\"

## 2024-12-26 ENCOUNTER — TELEPHONE (OUTPATIENT)
Facility: CLINIC | Age: 44
End: 2024-12-26

## 2024-12-26 NOTE — TELEPHONE ENCOUNTER
Health maintenance updated. Last colonoscopy done 12/14/24. 10 year recall placed into [facility], next due on 12/2034 per Dr. Merida.

## 2025-01-16 ENCOUNTER — OFFICE VISIT (OUTPATIENT)
Dept: OBGYN CLINIC | Facility: CLINIC | Age: 45
End: 2025-01-16
Payer: COMMERCIAL

## 2025-01-16 VITALS
WEIGHT: 156 LBS | HEART RATE: 74 BPM | DIASTOLIC BLOOD PRESSURE: 88 MMHG | SYSTOLIC BLOOD PRESSURE: 134 MMHG | BODY MASS INDEX: 27.64 KG/M2 | HEIGHT: 63 IN

## 2025-01-16 DIAGNOSIS — Z11.3 SCREENING FOR STD (SEXUALLY TRANSMITTED DISEASE): ICD-10-CM

## 2025-01-16 DIAGNOSIS — N89.8 VAGINAL IRRITATION: Primary | ICD-10-CM

## 2025-01-16 PROCEDURE — 99213 OFFICE O/P EST LOW 20 MIN: CPT | Performed by: ADVANCED PRACTICE MIDWIFE

## 2025-01-16 NOTE — PROGRESS NOTES
Patient seen in conjunction with [facility]. I personally witnessed the patient's exam and medical decision making on this date of service.  I was physically present in the room for the performance of the E/M service.  I have reviewed the CNM student's documentation and findings including history, Exam, and Medical Decision Making, edited the document for accuracy and verify that it represents the clinical findings and services performed.

## 2025-01-16 NOTE — PROGRESS NOTES
Subjective   Parisa Lugo is a 44 year old F  presenting today for issue with vaginal infections.    Reports that after monthly periods having a lot of burning, itching and thin white discharge. This has been happening for 4 years. Was prescribed Diflucan and Nystantin ointment in December and symptoms resolved but now are starting again.   Denies urinary symptoms. Denies fever, chills, body aches and back aches.     Having regular monthly periods, LMP 2024.  Sexually active with 1 male partner; has not been having intercourse due to vaginal irritation   Feels safe in relationships  Does not use contraception  Hx STI: none desires STI testing today    Last Pap: 2024 per patient it was normal will bring records next time. Pt declines today   Family Hx of breast, ovarian, endometrial, and uterine cancer: Declines    Denies alcohol use  Denies smoking/vaping  Denies substance use    Offered for MA to be chaperone during physical exam. Patient declines.     HISTORY:  Past Medical History:    Screen for colon cancer    repeat CLN in 10 years      Past Surgical History:   Procedure Laterality Date    Colonoscopy N/A 2024    Procedure: COLONOSCOPY;  Surgeon: MISSY Merida MD;  Location: [facility] ENDOSCOPY    Correct bunion,simple Left 2011    Excision turbinate,submucous  2018    Hemorrhoidectomy  10/04/2018     x 2    Repair of nasal septum  2018    Umbilical hernia repair        Family History   Problem Relation Age of Onset    Other (Other) Father          work accident 25 yrs ago      Social History     Socioeconomic History    Marital status:     Number of children: 2   Occupational History    Occupation:    Tobacco Use    Smoking status: Never    Smokeless tobacco: Never   Vaping Use    Vaping status: Never Used   Substance and Sexual Activity    Alcohol use: No    Drug use: No   Social History Narrative    ** Merged History Encounter **          Social Drivers  [facility]      [facility]    Housing Stability         Review of Systems   Genitourinary:  Negative for dysuria, frequency and urgency.        Positive for vaginal itching   All other systems reviewed and are negative.    Objective  Vitals:    01/16/25 1454   BP: 134/88   Pulse: 74     Physical Exam  Vitals reviewed.   Eyes:      Conjunctiva/sclera: Conjunctivae normal.   Neck:      Thyroid: No thyroid mass, thyromegaly or thyroid tenderness.   Cardiovascular:      Rate and Rhythm: Normal rate.      Pulses: Normal pulses.      Heart sounds: Normal heart sounds.   Pulmonary:      Effort: Pulmonary effort is normal.      Breath sounds: Normal breath sounds.   Chest:   Breasts:     Right: No swelling, mass, skin change or tenderness.      Left: No swelling, mass, skin change or tenderness.   Genitourinary:     General: Normal vulva.      Vagina: Normal. No vaginal discharge, tenderness or lesions.      Cervix: Normal. No discharge, lesion or cervical bleeding.   Skin:     General: Skin is warm and dry.   Neurological:      General: No focal deficit present.      Mental Status: She is alert.   Psychiatric:         Mood and Affect: Mood normal.     Mammogram 8/2024:   Impression   CONCLUSION:   No mammographic evidence of malignancy.     BI-RADS CATEGORY:    DIAGNOSTIC CATEGORY 1--NEGATIVE ASSESSMENT.       RECOMMENDATIONS:  ROUTINE MAMMOGRAM AND CLINICAL EVALUATION IN 12 MONTHS.     Assessment/Plan  1. Screening for STD (sexually transmitted disease)  - Chlamydia/Gc Amplification; Future  - Chlamydia/Gc Amplification    2. Vaginal irritation  - Vaginitis Vaginosis PCR Panel; Future  - Vaginitis Vaginosis PCR Panel      -Vaginitis/vaginosis and GC/CT collected. Will treat if indicated  -TARA signed by patient to obtain pap results    Counseling:   Best hygiene practices  Contraceptive method(s), STI and HIV risk reduction/condom use  Frequency of health  screening and personal risks  Pap, SBE/CBE, mammography  Benefits of adequate fresh fruits and vegetables  Benefits of daily exercise    JUSTICE Silva under direct supervision of Yamilex Kunz CNM

## 2025-01-17 LAB
BV BACTERIA DNA VAG QL NAA+PROBE: NEGATIVE
C GLABRATA DNA VAG QL NAA+PROBE: NEGATIVE
C KRUSEI DNA VAG QL NAA+PROBE: NEGATIVE
C TRACH DNA SPEC QL NAA+PROBE: NEGATIVE
CANDIDA DNA VAG QL NAA+PROBE: NEGATIVE
N GONORRHOEA DNA SPEC QL NAA+PROBE: NEGATIVE
T VAGINALIS DNA VAG QL NAA+PROBE: NEGATIVE

## 2025-01-22 ENCOUNTER — TELEPHONE (OUTPATIENT)
Facility: CLINIC | Age: 45
End: 2025-01-22

## 2025-01-22 NOTE — TELEPHONE ENCOUNTER
1ST letter sent to patient's home -cs        Helicobacter Pylori Breath Test, Adult (Order #[id number]) on 12/3/24

## 2025-01-27 RX ORDER — TRIAMCINOLONE ACETONIDE 1 MG/G
1 OINTMENT TOPICAL NIGHTLY
Qty: 15 G | Refills: 0 | Status: SHIPPED | OUTPATIENT
Start: 2025-01-27

## 2025-02-05 ENCOUNTER — OFFICE VISIT (OUTPATIENT)
Dept: FAMILY MEDICINE CLINIC | Facility: CLINIC | Age: 45
End: 2025-02-05

## 2025-02-05 VITALS
DIASTOLIC BLOOD PRESSURE: 82 MMHG | BODY MASS INDEX: 27.85 KG/M2 | SYSTOLIC BLOOD PRESSURE: 133 MMHG | HEIGHT: 63 IN | WEIGHT: 157.19 LBS | HEART RATE: 73 BPM

## 2025-02-05 DIAGNOSIS — M72.2 PLANTAR FASCIITIS OF LEFT FOOT: Primary | ICD-10-CM

## 2025-02-05 PROCEDURE — 99213 OFFICE O/P EST LOW 20 MIN: CPT | Performed by: FAMILY MEDICINE

## 2025-02-05 NOTE — PROGRESS NOTES
2025  1:43 PM    Parisa Lugo is a 44 year old female.    Chief complaint(s):   Chief Complaint   Patient presents with    Heel Pain     Left heel pain x 2 months, c/o pain with walking and weight bearing,denies any recent injures. Has not tried any treatment      HPI:     Parisa Lugo primary complaint is regarding heel pain.     Patient is a 44-year-old female who presents complaining of left medial heel pain for the past 2 months.  Pain tends to get worse with the first steps she takes in the morning.  Also pain gets worse after prolonged walking or standing.  Denies any trauma accidents or injuries.  There is no paresthesia associated with this.      HISTORY:  Past Medical History:    Screen for colon cancer    repeat CLN in 10 years      Past Surgical History:   Procedure Laterality Date    Colonoscopy N/A 2024    Procedure: COLONOSCOPY;  Surgeon: MISSY Merida MD;  Location: [facility] ENDOSCOPY    Correct bunion,simple Left 2011    Excision turbinate,submucous  2018    Hemorrhoidectomy  10/04/2018     x 2    Repair of nasal septum  2018    Umbilical hernia repair        Family History   Problem Relation Age of Onset    Other (Other) Father          work accident 25 yrs ago      Social History:   Social History     Socioeconomic History    Marital status:     Number of children: 2   Occupational History    Occupation:    Tobacco Use    Smoking status: Never    Smokeless tobacco: Never   Vaping Use    Vaping status: Never Used   Substance and Sexual Activity    Alcohol use: No    Drug use: No   Social History Narrative    ** Merged History Encounter **          Social Drivers of Health      [facility], [facility]    Housing Stability        Immunizations:   Immunization History   Administered Date(s) Administered    Covid-19 Vaccine Moderna 100 mcg/0.5 ml 06/15/2021, 2021    Influenza 10/15/2020        Medications (Active prior to today's visit):  Current Outpatient Medications   Medication Sig Dispense Refill    diclofenac 50 MG Oral Tab EC Take 1 tablet (50 mg total) by mouth 2 (two) times daily. Take it with food 60 tablet 0       Allergies:  Allergies[1]      ROS:   Review of Systems   Constitutional:  Negative for appetite change and fever.   Eyes:  Negative for visual disturbance.   Respiratory:  Negative for shortness of breath.    Cardiovascular:  Negative for chest pain.   Gastrointestinal:  Negative for abdominal pain, nausea and vomiting.   Musculoskeletal:  Negative for back pain.        Left heel pain   Skin:  Negative for rash.   Neurological:  Negative for dizziness and headaches.       PHYSICAL EXAM:   VS: /82 (BP Location: Right arm, Patient Position: Sitting, Cuff Size: adult)   Pulse 73   Ht 5' 3\" (1.6 m)   Wt 157 lb 3.2 oz (71.3 kg)   LMP 01/31/2025 (Approximate)   BMI 27.85 kg/m²     Physical Exam  Vitals reviewed.   Constitutional:       General: She is not in acute distress.     Appearance: Normal appearance.   HENT:      Head: Normocephalic.   Eyes:      Conjunctiva/sclera: Conjunctivae normal.   Cardiovascular:      Rate and Rhythm: Normal rate.   Pulmonary:      Effort: Pulmonary effort is normal.   Musculoskeletal:      Cervical back: Neck supple.   Feet:      Comments: + left medial heel tenderness  Skin:     Findings: No rash.   Psychiatric:         Mood and Affect: Mood normal.         LABORATORY RESULTS:     EKG / Spirometry : -     Radiology: No results found.     ASSESSMENT/PLAN:   Assessment   Encounter Diagnosis   Name Primary?    Plantar fasciitis of left foot Yes       MEDICATIONS:     Requested Prescriptions     Signed Prescriptions Disp Refills    diclofenac 50 MG Oral Tab EC 60 tablet 0     Sig: Take 1 tablet (50 mg total) by mouth 2 (two) times daily. Take it with food     REFERRALS: PHYSICAL THERAPY - INTERNAL  XR FOOT (2 VIEW), LEFT (CPT=73620),        Procedures    PHYSICAL THERAPY - INTERNAL        RECOMMENDATIONS given include: Patient was reassured of  her medical condition and all questions and concerns were answered. Patient was informed to please, call our office with any new or further questions or concerns that may come up in the near future. Notify Dr Daly or the [facility] if there is a deterioration or worsening of the medical condition. Also, inform the doctor with any new symptoms or medications' side effects.  Ice bottle water plantar stretching exercise at home.    FOLLOW-UP: Schedule a follow-up visit in 6 weeks.            Orders This Visit:  No orders of the defined types were placed in this encounter.      Meds This Visit:  Requested Prescriptions     Signed Prescriptions Disp Refills    diclofenac 50 MG Oral Tab EC 60 tablet 0     Sig: Take 1 tablet (50 mg total) by mouth 2 (two) times daily. Take it with food       Imaging & Referrals:  PHYSICAL THERAPY - INTERNAL  XR FOOT (2 VIEW), LEFT (CPT=73620)         DOUGIE DALY MD       [1]   Allergies  Allergen Reactions    Pollen Runny nose

## 2025-02-25 ENCOUNTER — HOSPITAL ENCOUNTER (OUTPATIENT)
Dept: GENERAL RADIOLOGY | Age: 45
Discharge: HOME | End: 2025-02-25
Attending: FAMILY MEDICINE
Payer: COMMERCIAL

## 2025-02-25 DIAGNOSIS — M72.2 PLANTAR FASCIITIS OF LEFT FOOT: ICD-10-CM

## 2025-02-25 PROCEDURE — 73630 X-RAY EXAM OF FOOT: CPT | Performed by: FAMILY MEDICINE

## 2025-03-01 ENCOUNTER — NURSE ONLY (OUTPATIENT)
Dept: LAB | Age: 45
End: 2025-03-01
Attending: NURSE PRACTITIONER
Payer: COMMERCIAL

## 2025-03-01 DIAGNOSIS — A04.8 HELICOBACTER PYLORI (H. PYLORI) INFECTION: ICD-10-CM

## 2025-03-01 PROCEDURE — 83013 H PYLORI (C-13) BREATH: CPT

## 2025-03-03 ENCOUNTER — APPOINTMENT (OUTPATIENT)
Dept: GENERAL RADIOLOGY | Age: 45
End: 2025-03-03
Attending: PHYSICIAN ASSISTANT
Payer: COMMERCIAL

## 2025-03-03 ENCOUNTER — HOSPITAL ENCOUNTER (OUTPATIENT)
Age: 45
Discharge: HOME | End: 2025-03-03
Payer: COMMERCIAL

## 2025-03-03 VITALS
DIASTOLIC BLOOD PRESSURE: 74 MMHG | RESPIRATION RATE: 18 BRPM | HEART RATE: 74 BPM | OXYGEN SATURATION: 100 % | TEMPERATURE: 98 F | SYSTOLIC BLOOD PRESSURE: 146 MMHG

## 2025-03-03 DIAGNOSIS — M25.511 ACUTE PAIN OF BOTH SHOULDERS: Primary | ICD-10-CM

## 2025-03-03 DIAGNOSIS — M25.512 ACUTE PAIN OF BOTH SHOULDERS: Primary | ICD-10-CM

## 2025-03-03 LAB
#MXD IC: 0.4 X10ˆ3/UL (ref 0.1–1)
ATRIAL RATE: 56 BPM
BUN BLD-MCNC: 10 MG/DL (ref 7–18)
CHLORIDE BLD-SCNC: 106 MMOL/L (ref 98–112)
CO2 BLD-SCNC: 24 MMOL/L (ref 21–32)
CREAT BLD-MCNC: 0.7 MG/DL
EGFRCR SERPLBLD CKD-EPI 2021: 108 ML/MIN/1.73M2 (ref 60–?)
GLUCOSE BLD-MCNC: 93 MG/DL (ref 70–99)
HCT VFR BLD AUTO: 37.1 %
HCT VFR BLD CALC: 41 %
HGB BLD-MCNC: 12 G/DL
ISTAT IONIZED CALCIUM FOR CHEM 8: 1.2 MMOL/L (ref 1.12–1.32)
LYMPHOCYTES # BLD AUTO: 2.3 X10ˆ3/UL (ref 1–4)
LYMPHOCYTES NFR BLD AUTO: 32.9 %
MCH RBC QN AUTO: 28.5 PG (ref 26–34)
MCHC RBC AUTO-ENTMCNC: 32.3 G/DL (ref 31–37)
MCV RBC AUTO: 88.1 FL (ref 80–100)
MIXED CELL %: 6.1 %
NEUTROPHILS # BLD AUTO: 4.3 X10ˆ3/UL (ref 1.5–7.7)
NEUTROPHILS NFR BLD AUTO: 61 %
P AXIS: 61 DEGREES
P-R INTERVAL: 150 MS
PLATELET # BLD AUTO: 261 X10ˆ3/UL (ref 150–450)
POTASSIUM BLD-SCNC: 4.4 MMOL/L (ref 3.6–5.1)
Q-T INTERVAL: 448 MS
QRS DURATION: 80 MS
QTC CALCULATION (BEZET): 432 MS
R AXIS: 58 DEGREES
RBC # BLD AUTO: 4.21 X10ˆ6/UL
SODIUM BLD-SCNC: 138 MMOL/L (ref 136–145)
T AXIS: 45 DEGREES
TROPONIN I BLD-MCNC: <0.02 NG/ML
VENTRICULAR RATE: 56 BPM
WBC # BLD AUTO: 7 X10ˆ3/UL (ref 4–11)

## 2025-03-03 PROCEDURE — 85025 COMPLETE CBC W/AUTO DIFF WBC: CPT | Performed by: PHYSICIAN ASSISTANT

## 2025-03-03 PROCEDURE — 80047 BASIC METABLC PNL IONIZED CA: CPT | Performed by: PHYSICIAN ASSISTANT

## 2025-03-03 PROCEDURE — 71046 X-RAY EXAM CHEST 2 VIEWS: CPT | Performed by: PHYSICIAN ASSISTANT

## 2025-03-03 PROCEDURE — 93000 ELECTROCARDIOGRAM COMPLETE: CPT | Performed by: PHYSICIAN ASSISTANT

## 2025-03-03 PROCEDURE — 99214 OFFICE O/P EST MOD 30 MIN: CPT | Performed by: PHYSICIAN ASSISTANT

## 2025-03-03 PROCEDURE — 84484 ASSAY OF TROPONIN QUANT: CPT | Performed by: PHYSICIAN ASSISTANT

## 2025-03-03 NOTE — ED PROVIDER NOTES
Patient Seen in: [facility]      History     Chief Complaint   Patient presents with    Back Pain     Stated Complaint: Chest pain; Difficuilty Breathing; Shoulder and back pain    Subjective:   HPI    44-year-old female presents for evaluation of bilateral shoulder pain which started a few hours ago while at work.  Patient states the pain started in the bilateral shoulders and then radiated towards the chest.  She felt like she could not take a deep breath in during this time.  This pain lasted 5 to 10 minutes and resolved spontaneously.  States she was seated at her computer while this was happening.  She has no pain currently.  Denies any recent travel.  No complaint of shortness of breath.    Objective:     Past Medical History:    Screen for colon cancer    repeat CLN in 10 years              Past Surgical History:   Procedure Laterality Date    Colonoscopy N/A 12/14/2024    Procedure: COLONOSCOPY;  Surgeon: MISSY Merida MD;  Location: [facility] ENDOSCOPY    Correct bunion,simple Left 2011    Excision turbinate,submucous  11/21/2018    Hemorrhoidectomy  10/04/2018     x 2    Repair of nasal septum  11/21/2018    Umbilical hernia repair  2008                Social History     Socioeconomic History    Marital status:     Number of children: 2   Occupational History    Occupation:    Tobacco Use    Smoking status: Never    Smokeless tobacco: Never   Vaping Use    Vaping status: Never Used   Substance and Sexual Activity    Alcohol use: No    Drug use: No   Social History Narrative    ** Merged History Encounter **          Social Drivers of Health      [facility]    Housing Stability              Review of Systems    Positive for stated complaint: Chest pain; Difficuilty Breathing; Shoulder and back pain  Other systems are as noted in HPI.  Constitutional and vital signs reviewed.      All other systems reviewed  and negative except as noted above.    Physical Exam     ED Triage Vitals [03/03/25 1328]   /74   Pulse 74   Resp 18   Temp 97.6 °F (36.4 °C)   Temp src    SpO2 100 %   O2 Device None (Room air)       Current Vitals:   Vital Signs  BP: 146/74  Pulse: 74  Resp: 18  Temp: 97.6 °F (36.4 °C)    Oxygen Therapy  SpO2: 100 %  O2 Device: None (Room air)        Physical Exam  Vitals and nursing note reviewed.   Constitutional:       General: She is not in acute distress.  HENT:      Head: Normocephalic and atraumatic.      Right Ear: External ear normal.      Left Ear: External ear normal.      Nose: Nose normal.      Mouth/Throat:      Mouth: Mucous membranes are moist.   Eyes:      Extraocular Movements: Extraocular movements intact.      Pupils: Pupils are equal, round, and reactive to light.   Cardiovascular:      Rate and Rhythm: Normal rate.   Pulmonary:      Effort: Pulmonary effort is normal.      Breath sounds: No wheezing or rhonchi.   Abdominal:      General: Abdomen is flat.   Musculoskeletal:         General: Normal range of motion.      Cervical back: Normal range of motion.   Skin:     General: Skin is warm.   Neurological:      General: No focal deficit present.      Mental Status: She is alert and oriented to person, place, and time.   Psychiatric:         Mood and Affect: Mood normal.         Behavior: Behavior normal.             ED Course     Labs Reviewed   POCT ISTAT CHEM8 CARTRIDGE - Normal   ISTAT TROPONIN - Normal   POCT CBC     EKG    Rate, intervals and axes as noted on EKG Report.  Rate: 56  Rhythm: Sinus Rhythm  Reading: normal sinus rhythm, hr 56. No arrhythmia, no ectopy and no STEMI              44-year-old female presenting for evaluation of an episode of bilateral shoulder pain, chest discomfort which occurred 3 to 4 hours prior to arrival.  Patient is pain-free at this time.  Her vital signs are reassuring  Ddx-back spasm, panic attack, anginal equivalent, pneumonia    CBC, BMP  reassuring.  Troponin is negative.  EKG demonstrating sinus rhythm with no abnormality.   I discussed reassuring workup, chest x-ray findings and EKG findings.  Patient has remained pain-free since the immediate care.  We discussed PCP follow-up and ER return precautions.           MDM              Medical Decision Making      Disposition and Plan     Clinical Impression:  1. Acute pain of both shoulders         Disposition:  Discharge  3/3/2025  3:12 pm    Follow-up:  Du Miller MD  [address] [place] [id number]  [phone]                Medications Prescribed:  Current Discharge Medication List              Supplementary Documentation:

## 2025-03-03 NOTE — ED INITIAL ASSESSMENT (HPI)
Pt complaining of bilateral shoulder and upper back pain since 10 am with left sided chest pain with sob.  Pt states the pain is intermittent.

## 2025-03-04 LAB — H PYLORI BREATH TEST: POSITIVE

## 2025-03-05 ENCOUNTER — TELEPHONE (OUTPATIENT)
Dept: GASTROENTEROLOGY | Facility: CLINIC | Age: 45
End: 2025-03-05

## 2025-03-05 DIAGNOSIS — A04.8 HELICOBACTER PYLORI (H. PYLORI) INFECTION: Primary | ICD-10-CM

## 2025-03-05 RX ORDER — METRONIDAZOLE 250 MG/1
250 TABLET ORAL 4 TIMES DAILY
Qty: 56 TABLET | Refills: 0 | Status: SHIPPED | OUTPATIENT
Start: 2025-03-05 | End: 2025-03-19

## 2025-03-05 RX ORDER — BISMUTH SUBSALICYLATE 262 MG/1
2 TABLET, CHEWABLE ORAL 4 TIMES DAILY
Qty: 112 TABLET | Refills: 0 | Status: SHIPPED | OUTPATIENT
Start: 2025-03-05 | End: 2025-03-19

## 2025-03-05 RX ORDER — OMEPRAZOLE 20 MG/1
20 CAPSULE, DELAYED RELEASE ORAL
Qty: 28 CAPSULE | Refills: 0 | Status: SHIPPED | OUTPATIENT
Start: 2025-03-05 | End: 2025-03-19

## 2025-03-05 RX ORDER — TETRACYCLINE HYDROCHLORIDE 500 MG/1
500 CAPSULE ORAL 4 TIMES DAILY
Qty: 56 CAPSULE | Refills: 0 | Status: SHIPPED | OUTPATIENT
Start: 2025-03-05 | End: 2025-03-19

## 2025-03-05 NOTE — TELEPHONE ENCOUNTER
H.pylori gastric infection identified on a test. Tshe is pos again after treatment with triple therapy.  The natural history of H.pylori was reviewed with the patient, as well as possible complications from H.pylori including stomach cancer, gastritis, dyspepsia, anemia and ulcers. We discussed treatment options and rationale for treatment, as well as testing for eradication. The patient understands the risks/benefits/side-effects of treatment and wants to proceed. The patient understands only 80-95% of patients have eradication to initial course of abx, and therefore may need another course of abx if fails first treatment. There may be side-effects during treatment and patient should call if any problems.      Plan:  Will start w/ quadruple therapy (PPI + tetra + metronidazole + pepto-bismuth) x 14 days.   I have instructed patient to check repeat H.pylori test in 8-10 weeks to ensure eradication (order placed).

## 2025-03-26 ENCOUNTER — TELEPHONE (OUTPATIENT)
Dept: FAMILY MEDICINE CLINIC | Facility: CLINIC | Age: 45
End: 2025-03-26

## 2025-03-31 NOTE — TELEPHONE ENCOUNTER
Please review.  Protocol failed / Has no protocol.    New medication written 2/5/25 #60 with zero refills.  Is refill appropriate?     From 2/5/25 : \"FOLLOW-UP: Schedule a follow-up visit in 6 weeks.\"   [employer] message sent to patient to schedule an office visit with Primary Care Provider.   Will also route to Call Center to make a phone attempt.      Requested Prescriptions   Pending Prescriptions Disp Refills    diclofenac 50 MG Oral Tab EC [Pharmacy Med Name: DICLOFENAC SODIUM 50MG DR TABLETS] 60 tablet 0     Sig: Take 1 tablet (50 mg total) by mouth 2 (two) times daily. Take it with food       Non-Narcotic Pain Medication Protocol Failed - 3/31/2025 11:53 AM        Failed - Medication is active on med list        Passed - In person appointment or virtual visit in the past 6 mos or appointment in next 3 mos

## 2025-03-31 NOTE — TELEPHONE ENCOUNTER
Please call patient to make an appointment.  Thank you   From 2/5/25: FOLLOW-UP: Schedule a follow-up visit in 6 weeks.

## 2025-04-07 ENCOUNTER — OFFICE VISIT (OUTPATIENT)
Dept: FAMILY MEDICINE CLINIC | Facility: CLINIC | Age: 45
End: 2025-04-07
Payer: COMMERCIAL

## 2025-04-07 VITALS
HEART RATE: 71 BPM | BODY MASS INDEX: 28.03 KG/M2 | DIASTOLIC BLOOD PRESSURE: 82 MMHG | HEIGHT: 63 IN | SYSTOLIC BLOOD PRESSURE: 132 MMHG | WEIGHT: 158.19 LBS

## 2025-04-07 DIAGNOSIS — M72.2 PLANTAR FASCIITIS OF LEFT FOOT: Primary | ICD-10-CM

## 2025-04-07 PROCEDURE — 99213 OFFICE O/P EST LOW 20 MIN: CPT | Performed by: FAMILY MEDICINE

## 2025-04-07 RX ORDER — NAPROXEN 500 MG/1
500 TABLET ORAL 2 TIMES DAILY WITH MEALS
Qty: 60 TABLET | Refills: 1 | Status: SHIPPED | OUTPATIENT
Start: 2025-04-07

## 2025-04-07 NOTE — PROGRESS NOTES
2025  1:10 PM    Parisa Lugo is a 44 year old female.    Chief complaint(s):   Chief Complaint   Patient presents with    Foot Pain     F/u plantar fascitis left foot but now is complaining that right foot is also bothering her      HPI:     Parisa Lugo primary complaint is regarding heel pain.     Patient is a 44-year-old female with history of plantar fasciitis who is following up reporting that the condition has not improved..  She was on diclofenac did not help.  She was referred to physical therapy but she never went.  Will restart the process and change her NSAID and regenerate physical therapy referral.  No recent trauma accidents or injuries.  No swelling or paresthesias involving the foot.      HISTORY:  Past Medical History:    Screen for colon cancer    repeat CLN in 10 years      Past Surgical History:   Procedure Laterality Date    Colonoscopy N/A 2024    Procedure: COLONOSCOPY;  Surgeon: MISSY Merida MD;  Location: [facility] ENDOSCOPY    Correct bunion,simple Left 2011    Excision turbinate,submucous  2018    Hemorrhoidectomy  10/04/2018     x 2    Repair of nasal septum  2018    Umbilical hernia repair  2008      Family History   Problem Relation Age of Onset    Other (Other) Father          work accident 25 yrs ago      Social History:   Social History     Socioeconomic History    Marital status:     Number of children: 2   Occupational History    Occupation:    Tobacco Use    Smoking status: Never    Smokeless tobacco: Never   Vaping Use    Vaping status: Never Used   Substance and Sexual Activity    Alcohol use: No    Drug use: No   Social History Narrative    ** Merged History Encounter **          Social Drivers of Health      Received from [facility], [facility]    Housing Stability        Immunizations:   Immunization History   Administered Date(s) Administered    Covid-19 Vaccine Moderna 100 mcg/0.5 ml  06/15/2021, 07/24/2021    Influenza 10/15/2020       Medications (Active prior to today's visit):  Current Outpatient Medications   Medication Sig Dispense Refill    naproxen 500 MG Oral Tab Take 1 tablet (500 mg total) by mouth 2 (two) times daily with meals. 60 tablet 1       Allergies:  Allergies[1]      ROS:   Review of Systems   Constitutional:  Negative for appetite change and fever.   Eyes:  Negative for visual disturbance.   Respiratory:  Negative for shortness of breath.    Cardiovascular:  Negative for chest pain.   Gastrointestinal:  Negative for abdominal pain, nausea and vomiting.   Musculoskeletal:  Negative for back pain.        Left heel pain   Skin:  Negative for rash.   Neurological:  Negative for dizziness and headaches.       PHYSICAL EXAM:   VS: /82 (BP Location: Right arm, Patient Position: Sitting, Cuff Size: adult)   Pulse 71   Ht 5' 3\" (1.6 m)   Wt 158 lb 3.2 oz (71.8 kg)   LMP 03/24/2025 (Approximate)   BMI 28.02 kg/m²     Physical Exam  Vitals reviewed.   Constitutional:       General: She is not in acute distress.     Appearance: Normal appearance.   HENT:      Head: Normocephalic.   Eyes:      Conjunctiva/sclera: Conjunctivae normal.   Cardiovascular:      Rate and Rhythm: Normal rate.   Pulmonary:      Effort: Pulmonary effort is normal.   Musculoskeletal:      Cervical back: Neck supple.   Skin:     Findings: No rash.   Psychiatric:         Mood and Affect: Mood normal.         LABORATORY RESULTS:     EKG / Spirometry : -     Radiology: No results found.     ASSESSMENT/PLAN:   Assessment   Encounter Diagnosis   Name Primary?    Plantar fasciitis of left foot Yes       MEDICATIONS:     Requested Prescriptions     Signed Prescriptions Disp Refills    naproxen 500 MG Oral Tab 60 tablet 1     Sig: Take 1 tablet (500 mg total) by mouth 2 (two) times daily with meals.     REFERRALS: PHYSICAL THERAPY - INTERNAL,       Procedures    PHYSICAL THERAPY - INTERNAL        RECOMMENDATIONS  given include: Patient was reassured of  her medical condition and all questions and concerns were answered. Patient was informed to please, call our office with any new or further questions or concerns that may come up in the near future. Notify Dr Daly or the [facility] if there is a deterioration or worsening of the medical condition. Also, inform the doctor with any new symptoms or medications' side effects.      FOLLOW-UP: Schedule a follow-up visit in 2 weeks.            Orders This Visit:  No orders of the defined types were placed in this encounter.      Meds This Visit:  Requested Prescriptions     Signed Prescriptions Disp Refills    naproxen 500 MG Oral Tab 60 tablet 1     Sig: Take 1 tablet (500 mg total) by mouth 2 (two) times daily with meals.       Imaging & Referrals:  PHYSICAL THERAPY - INTERNAL         DOUGIE DALY MD         [1]   Allergies  Allergen Reactions    Pollen Runny nose

## 2025-04-28 ENCOUNTER — TELEPHONE (OUTPATIENT)
Facility: CLINIC | Age: 45
End: 2025-04-28

## 2025-04-28 NOTE — TELEPHONE ENCOUNTER
1st,overdue reminder letter mailed out to patient   Labs  order  Orders Placed on 3/5/2025    Helicobacter Pylori Breath Test, Adult     Refer TE from 3/5/2025   H. Pylori testing recall entered. Due 5/2025.      Plan:  Will start w/ quadruple therapy (PPI + tetra + metronidazole + pepto-bismuth) x 14 days.   I have instructed patient to check repeat H.pylori test in 8-10 weeks to ensure eradication (order placed).

## 2025-05-13 ENCOUNTER — TELEPHONE (OUTPATIENT)
Dept: GASTROENTEROLOGY | Facility: CLINIC | Age: 45
End: 2025-05-13

## 2025-05-13 ENCOUNTER — APPOINTMENT (OUTPATIENT)
Dept: PHYSICAL THERAPY | Age: 45
End: 2025-05-13
Attending: FAMILY MEDICINE
Payer: COMMERCIAL

## 2025-05-13 NOTE — TELEPHONE ENCOUNTER
[employer] interpretor # [id number] used to call the patient    I reviewed the below note with the patient, she verbalized understanding    Patient is aware she is due to complete h. Pylori test    Patient has no further questions at this time

## 2025-05-20 ENCOUNTER — APPOINTMENT (OUTPATIENT)
Dept: PHYSICAL THERAPY | Age: 45
End: 2025-05-20
Attending: FAMILY MEDICINE
Payer: COMMERCIAL

## 2025-05-31 ENCOUNTER — LAB ENCOUNTER (OUTPATIENT)
Dept: LAB | Age: 45
End: 2025-05-31
Attending: NURSE PRACTITIONER
Payer: COMMERCIAL

## 2025-05-31 DIAGNOSIS — A04.8 HELICOBACTER PYLORI (H. PYLORI) INFECTION: ICD-10-CM

## 2025-05-31 PROCEDURE — 83013 H PYLORI (C-13) BREATH: CPT

## 2025-06-02 ENCOUNTER — APPOINTMENT (OUTPATIENT)
Dept: PHYSICAL THERAPY | Age: 45
End: 2025-06-02
Attending: FAMILY MEDICINE
Payer: COMMERCIAL

## 2025-06-04 LAB — H PYLORI BREATH TEST: NEGATIVE

## 2025-06-09 ENCOUNTER — APPOINTMENT (OUTPATIENT)
Dept: PHYSICAL THERAPY | Age: 45
End: 2025-06-09
Attending: FAMILY MEDICINE
Payer: COMMERCIAL

## 2025-06-16 ENCOUNTER — APPOINTMENT (OUTPATIENT)
Dept: PHYSICAL THERAPY | Age: 45
End: 2025-06-16
Attending: FAMILY MEDICINE
Payer: COMMERCIAL

## 2025-06-23 ENCOUNTER — APPOINTMENT (OUTPATIENT)
Dept: PHYSICAL THERAPY | Age: 45
End: 2025-06-23
Attending: FAMILY MEDICINE
Payer: COMMERCIAL

## 2025-06-30 ENCOUNTER — APPOINTMENT (OUTPATIENT)
Dept: PHYSICAL THERAPY | Age: 45
End: 2025-06-30
Attending: FAMILY MEDICINE
Payer: COMMERCIAL

## 2025-07-07 ENCOUNTER — APPOINTMENT (OUTPATIENT)
Dept: PHYSICAL THERAPY | Age: 45
End: 2025-07-07
Attending: FAMILY MEDICINE

## 2025-07-15 ENCOUNTER — TELEPHONE (OUTPATIENT)
Facility: CLINIC | Age: 45
End: 2025-07-15

## 2025-07-15 NOTE — TELEPHONE ENCOUNTER
[employer] Interpretor #[id number] used to call the patient    Left message to call back

## 2025-07-17 ENCOUNTER — OFFICE VISIT (OUTPATIENT)
Dept: GASTROENTEROLOGY | Facility: CLINIC | Age: 45
End: 2025-07-17

## 2025-07-17 VITALS
WEIGHT: 160 LBS | SYSTOLIC BLOOD PRESSURE: 132 MMHG | BODY MASS INDEX: 28.35 KG/M2 | HEART RATE: 65 BPM | HEIGHT: 63 IN | DIASTOLIC BLOOD PRESSURE: 75 MMHG

## 2025-07-17 DIAGNOSIS — A04.8 HELICOBACTER PYLORI (H. PYLORI) INFECTION: ICD-10-CM

## 2025-07-17 DIAGNOSIS — K59.04 CHRONIC IDIOPATHIC CONSTIPATION: Primary | ICD-10-CM

## 2025-07-17 DIAGNOSIS — Z12.11 COLON CANCER SCREENING: ICD-10-CM

## 2025-07-17 PROCEDURE — 99215 OFFICE O/P EST HI 40 MIN: CPT | Performed by: NURSE PRACTITIONER

## 2025-07-17 RX ORDER — LINACLOTIDE 72 UG/1
72 CAPSULE, GELATIN COATED ORAL DAILY
Qty: 30 CAPSULE | Refills: 1 | Status: SHIPPED | OUTPATIENT
Start: 2025-07-17 | End: 2025-08-16

## 2025-07-17 RX ORDER — HYDROCORTISONE 25 MG/G
1 CREAM TOPICAL 2 TIMES DAILY
Qty: 30 G | Refills: 0 | Status: SHIPPED | OUTPATIENT
Start: 2025-07-17

## 2025-07-17 NOTE — PATIENT INSTRUCTIONS
-2-3 liters of  water/daily  -30 g of dietary fiber  -30 min of physical activity  -miralax 1-2 capfuls and dulcolax every 2nd to 3rd day if no bm (stop when linzess approved)  -linzess 72 mcg  -anusol twice daily x 2 weeks for hemorrhoids  -squatty potty  -if on-going symptoms contact office and consider the need for further work-up  -er if condition decline    Clearing fecal retention with Miralax (or Dulcolax Balance) and Gatorade     1. Buy one 238 gram bottle of Miralax or Dulcolax Balance. Buy 64 oz of the Gatorade flavor of your choice.      2. Pour the contents of the MiraLAX bottle and the 64 oz of. Gatorade into a pitcher and stir. Let the \"punch\" sit for five minutes for the solution to fully dissolve. This will taste better if you drink it cold.      3. Drink 8 oz. of the \"punch\" every 20 to 30 minutes until completed. Okay to have clear liquids while drinking the laxative. Do not eat solid food while drinking the washout solution.     4. Once completed the solution, wait a few hours and then okay to have a light meal later in the day.

## 2025-07-17 NOTE — PROGRESS NOTES
[facility] - Gastroenterology                                                                                                               Reason for consult: eval    Requesting physician or provider: DOUGIE DALY MD    Chief Complaint   Patient presents with    Constipation       HPI:   Parisa Lugo is a 45 year old year-old female without significant Pmhx:     she is here today for f/U  She declined an     Telehealth visit 11/2024  C/o  Constipation, brbpr attributed to hemorrhoids  Gerd    S/p screening cln 12/2024 (see results below)    Hpylori pos 3/2025  Treated with quadruple therapy   Neg eradication testing 5/2025    Contacted office today with c/o worsening constipation, abd pain, bloating  Sx x last 3-4 days    Did have hemorrhoidectomy in the past, however feels hemorrhoids have returned 2/2 issues with constipation.     Currently having bm every 3 days. Tries to drink water, eat fruit, limit carbs.  Uses dulcolax prn - does help, but has fears about dependence and uses therapy prn. Has tried miralax w/o improvement.  Feels bloated. Sx improve w/ bm.  she has had had low volume bleeding with hemorrhoids. No clots. No melena.     she denies acid reflux and/or heartburn. she denies dysphagia, odynophagia and/or globus. she denies abdominal pain. she denies nausea and/or vomiting.  she denies recent change in appetite and/or unintentional weight loss.      Has back pain at times, plantar fascitis. Trying to lose weight.    NSAIDS: no  Tobacco: no  Alcohol: no  Marijuana: no  Illicit drugs: no     No FH GI malignancy, IBD, celiac     No history of adverse reaction to sedation  No HARINDER  No anticoagulants  No pacemaker/defibrillator  No pain medications and/or sleep aides    C-scope 12/2024 w/ Dr hays    Impression:   Normal colonoscopy to the terminal ileum, other than small internal hemorrhoids.       Recommend:  Repeat CLN in 10 years. If new signs or symptoms develop, colonoscopy may need to be repeated sooner.   High fiber diet.  Monitor for blood in the stool. If having more than just tinge of blood, call office or go to the ER.  Miralax as needed to help with constipation.       Wt Readings from Last 6 Encounters:   07/17/25 160 lb (72.6 kg)   04/07/25 158 lb 3.2 oz (71.8 kg)   02/05/25 157 lb 3.2 oz (71.3 kg)   01/16/25 156 lb (70.8 kg)   12/11/24 156 lb (70.8 kg)   12/05/23 154 lb (69.9 kg)        History, Medications, Allergies, ROS:      Past Medical History[1]   Past Surgical History[2]   Family Hx: Family History[3]   Social History: Short Social Hx on File[4]     Medications (Active prior to today's visit):  Current Medications[5]    Allergies:  Allergies[6]    ROS:   CONSTITUTIONAL: negative for fevers, chills, sweats and weight loss  EYES Negative for red eyes, yellow eyes, changes in vision  HEENT: Negative for dysphagia and hoarseness  RESPIRATORY: Negative for cough and shortness of breath  CARDIOVASCULAR: Negative for chest pain, palpitations  GASTROINTESTINAL: See HPI  GENITOURINARY: Negative for dysuria and frequency  MUSCULOSKELETAL: Negative for arthralgias and myalgias  NEUROLOGICAL: Negative for dizziness and headaches  BEHAVIOR/PSYCH: Negative for anxiety and poor appetite    PHYSICAL EXAM:   Blood pressure 132/75, pulse 65, height 5' 3\" (1.6 m), weight 160 lb (72.6 kg), last menstrual period 03/24/2025, not currently breastfeeding.    GEN: WD/WN, NAD  HEENT: Supple symmetrical, trachea midline  CV: RRR, the extremities are warm and well perfused   LUNGS: No increased work of breathing  ABDOMEN: No scars, normal bowel sounds, soft, non-tender, non-distended no rebound or guarding, no masses, no hepatomegaly  MSK: No redness, no warmth, no swelling of joints  SKIN: No jaundice, no erythema, no rashes  HEMATOLOGIC: No bleeding, no bruising  NEURO: Alert and interactive, normal  gait    Labs/Imaging/Procedures:     Patient's pertinent labs and imaging were reviewed and discussed with patient today.        .  ASSESSMENT/PLAN:   Parisa Lugo is a 45 year old year-old female without significant Pmhx:     #crc screening  Due 12/2034 for crc screening    #hpylori   Pos testing 3/2025 s/p quadruple therapy with neg eradication testing (5/2025).    #constipation  She reports chronic constipation with associated straining, bloating.  Has had issues with hemorrhoids related to constipation. Recent c-scope w/o concerning finding. CBC 3/2025, TSH 11/2024 reassuring.   Currently using dulcolax prn.  Plan as below.     -2-3 liters of  water/daily  -30 g of dietary fiber  -30 min of physical activity  -miralax 1-2 capfuls and dulcolax every 2nd to 3rd day if no bm (stop when linzess approved)  -linzess 72 mcg  -anusol twice daily x 2 weeks for hemorrhoids  -squatty potty  -if on-going symptoms contact office and consider the need for further work-up  -er if condition decline    Clearing fecal retention with Miralax (or Dulcolax Balance) and Gatorade     1. Buy one 238 gram bottle of Miralax or Dulcolax Balance. Buy 64 oz of the Gatorade flavor of your choice.      2. Pour the contents of the MiraLAX bottle and the 64 oz of. Gatorade into a pitcher and stir. Let the \"punch\" sit for five minutes for the solution to fully dissolve. This will taste better if you drink it cold.      3. Drink 8 oz. of the \"punch\" every 20 to 30 minutes until completed. Okay to have clear liquids while drinking the laxative. Do not eat solid food while drinking the washout solution.     4. Once completed the solution, wait a few hours and then okay to have a light meal later in the day.          Orders This Visit:  No orders of the defined types were placed in this encounter.      Meds This Visit:  Requested Prescriptions     Signed Prescriptions Disp Refills    linaCLOtide (LINZESS) 72 MCG Oral Cap 30 capsule 1     Sig: Take 72  mcg by mouth daily.    hydrocortisone 2.5 % External Cream 30 g 0     Sig: Place 1 Application rectally 2 (two) times daily. Apply by topical route 2 times every day to the affected area(s)       Imaging & Referrals:  None      Sussy Gaspar, CHEN   2025        This note was partially prepared using Dragon Medical voice recognition dictation software. As a result, errors may occur. When identified, these errors have been corrected. While every attempt is made to correct errors during dictation, discrepancies may still exist.          [1]   Past Medical History:   Screen for colon cancer    repeat CLN in 10 years   [2]   Past Surgical History:  Procedure Laterality Date    Colonoscopy N/A 2024    Procedure: COLONOSCOPY;  Surgeon: MISSY Merida MD;  Location: [facility] ENDOSCOPY    Correct bunion,simple Left 2011    Excision turbinate,submucous  2018    Hemorrhoidectomy  10/04/2018     x 2    Repair of nasal septum  2018    Umbilical hernia repair     [3]   Family History  Problem Relation Age of Onset    Other (Other) Father          work accident 25 yrs ago   [4]   Social History  Socioeconomic History    Marital status:     Number of children: 2   Occupational History    Occupation:    Tobacco Use    Smoking status: Never    Smokeless tobacco: Never   Vaping Use    Vaping status: Never Used   Substance and Sexual Activity    Alcohol use: No    Drug use: No   Social History Narrative    ** Merged History Encounter **          Social Drivers of Health      Received from [facility]    Housing Stability   [5]   Current Outpatient Medications   Medication Sig Dispense Refill    linaCLOtide (LINZESS) 72 MCG Oral Cap Take 72 mcg by mouth daily. 30 capsule 1    hydrocortisone 2.5 % External Cream Place 1 Application rectally 2 (two) times daily. Apply by topical route 2 times every day to the affected area(s) 30 g 0    naproxen 500 MG Oral Tab  Take 1 tablet (500 mg total) by mouth 2 (two) times daily with meals. 60 tablet 0   [6]   Allergies  Allergen Reactions    Pollen Runny nose

## 2025-07-17 NOTE — TELEPHONE ENCOUNTER
Sussy-    [employer] Interpretor #[id number] used to call the patient    I spoke to the patient    Patient states that she has on and off constipation and has been feeling more constipated the last 3-4 days    Patient states that she has on and off abdominal pain (rates it a 5/10) that comes on randomly. She also complains of abdominal bloating    Patient has been passing gas and was able to pass a \"small/hard\" bowel movement yesterday (no blood noted)    She says she has tried taking OTC medications to help initiate a BM but says she does not want to rely on them    Patient states that she stays away from constipating foods and tries to eat enough fiber throughout the day. She is drinking an adequate amount of water daily    Patient says she had a hemorrhoidectomy a few years ago, but feels her hemorrhoids have returned due to her constipation    Patient would like further recommendations    Please advise    Thank you

## 2025-07-21 ENCOUNTER — TELEPHONE (OUTPATIENT)
Facility: CLINIC | Age: 45
End: 2025-07-21

## 2025-07-21 NOTE — TELEPHONE ENCOUNTER
Current Outpatient Medications   Medication Sig Dispense Refill    linaCLOtide (LINZESS) 72 MCG Oral Cap Take 72 mcg by mouth daily. 30 capsule 1     Key: [id number]

## (undated) DEVICE — MEDI-VAC NON-CONDUCTIVE SUCTION TUBING 6MM X 1.8M (6FT.) L: Brand: CARDINAL HEALTH

## (undated) DEVICE — DRAPE URO 112X63X131IN POLYPR FEN W/ PCH

## (undated) DEVICE — V2 SPECIMEN COLLECTION MANIFOLD KIT: Brand: NEPTUNE

## (undated) DEVICE — DRAPE,UNDRBUT,WHT GRAD PCH,CAPPORT,20/CS: Brand: MEDLINE

## (undated) DEVICE — GAMMEX® PI HYBRID SIZE 8, STERILE POWDER-FREE SURGICAL GLOVE, POLYISOPRENE AND NEOPRENE BLEND: Brand: GAMMEX

## (undated) DEVICE — SOLUTION IRRIG 1000ML ST H2O AQUALITE PLAS

## (undated) DEVICE — MINOR GENERAL: Brand: MEDLINE INDUSTRIES, INC.

## (undated) DEVICE — CYSTO PACK: Brand: MEDLINE INDUSTRIES, INC.

## (undated) DEVICE — SKIN PREP TRAY 4 COMPARTM TRAY: Brand: MEDLINE INDUSTRIES, INC.

## (undated) DEVICE — STERILE SURGICAL LUBRICANT, METAL TUBE: Brand: SURGILUBE

## (undated) DEVICE — UROLOGY DRAIN BAG

## (undated) DEVICE — SOLUTION IRRIG 1000ML 0.9% NACL USP BTL

## (undated) DEVICE — KIT CLEAN ENDOKIT 1.1OZ GOWNX2

## (undated) DEVICE — 60 ML SYRINGE REGULAR TIP: Brand: MONOJECT

## (undated) DEVICE — STERILE H2O FOR IRRIG .

## (undated) DEVICE — KIT ENDO ORCAPOD 160/180/190

## (undated) DEVICE — CO2 CANNULA,SSOFT,ADLT,7O2,4CO2,FEMALE: Brand: MEDLINE

## (undated) DEVICE — ADHESIVE SKIN TOP FOR WND CLSR DERMBND ADV

## (undated) DEVICE — SET IRRIG L96IN POST OP W/ NVENT 2ND PIERCING

## (undated) DEVICE — SLEEVE COMPR M KNEE LEN SGL USE KENDALL SCD

## (undated) DEVICE — MEDI-VAC NON-CONDUCTIVE SUCTION TUBING: Brand: CARDINAL HEALTH

## (undated) DEVICE — Device: Brand: JELCO

## (undated) DEVICE — GAMMEX® PI HYBRID SIZE 6.5, STERILE POWDER-FREE SURGICAL GLOVE, POLYISOPRENE AND NEOPRENE BLEND: Brand: GAMMEX

## (undated) DEVICE — SUTURE VCRL SZ 2-0 L27IN ABSRB UD L26MM CT-2

## (undated) NOTE — LETTER
Forrest Florez Md  [address]  [address] [facility], [address]       09/27/18        Patient: Kobe Abreu   YOB: 1980   Date of Visit: 9/27/2018       Dear  Dr. Genna Everett MD,      Thank you for referring Kobe Abreu to my practice.   P

## (undated) NOTE — LETTER
03/19/21        [address]      Dear Alex Stephens,    Our records indicate that you have outstanding lab work and or testing that was ordered for you and has not yet been completed:  Orders Placed This Encounter      ALT(SGPT) [E

## (undated) NOTE — LETTER
02/19/21        Milad Porras  [address]      Dear Gabriela Harrison,    Our records indicate that you have outstanding lab work and or testing that was ordered for you and has not yet been completed:  Orders Placed This Encounter      Hemoglobin A

## (undated) NOTE — LETTER
No referring provider defined for this encounter. 10/13/18        Patient: Chapis Katz   YOB: 1980   Date of Visit: 10/12/2018       Dear  Dr. Beth Luna MD,      Thank you for referring Chapis Katz to my practice.   Please find my ass

## (undated) NOTE — LETTER
9/25/2018              [facility]         Dear Carolyn Sanders,    This letter is to inform you that our office has made several attempts to reach you by phone without success.   We were attempting to contact you by phone

## (undated) NOTE — LETTER
01/22/25        Parisa Lugo  [address]  [facility] [address]      Dear Parisa,    Our records indicate that you have outstanding lab work and or testing that was ordered for you and has not yet been completed:  LAB  Helicobacter Pylori Breath Test, Adult (Order #[id number]) on 12/3/24     To provide you with the best possible care, please complete these orders at your earliest convenience. If you have recently completed these orders please disregard this letter.     If you have any questions please call the office at No information on file..     Thank you,

## (undated) NOTE — LETTER
1/22/2025          Parisa Lugo    [address]    [place] [address]         Dear Parisa,    Our records indicate that the tests ordered for you by CHEN Doty  have not been done.  If you have, in fact, already completed the tests or you do not wish to have the tests done, please contact our office at THE NUMBER LISTED BELOW.  Otherwise, please proceed with the testing.  Enclosed is a duplicate order for your convenience.        Helicobacter Pylori Breath Test, Adult (Order #[id number]) on 12/3/24           Sincerely,    CHEN Doty  [facility]  [address]  [place] [id number] [phone]

## (undated) NOTE — LETTER
12/19/20        Beau Barefoot  [address]      Dear An Quinonez,    Our records indicate that you have outstanding lab work and or testing that was ordered for you and has not yet been completed:  Orders Placed This Encounter      ALT(SGPT) [E

## (undated) NOTE — LETTER
4/28/2025          Parisa Lugo    [address]    [facility] [address]         Dear Parisa,    Our records indicate that the tests ordered for you by CHEN Doty  have not been done.  If you have, in fact, already completed the tests or you do not wish to have the tests done, please contact our office at THE NUMBER LISTED BELOW.  Otherwise, please proceed with the testing.  Enclosed is a duplicate order for your convenience.  Labs  order  Orders Placed on 3/5/2025  Helicobacter Pylori Breath Test, Adult ----Please go to the lab to complete this test. Make sure you do not take a PPI (omeprazole/pantoprazole) or H2 blocker (famotidine) medication for 2 weeks prior to the test as this could result in a false negative result. Also do not eat one hour prior to the test.It is nothing to eat or drink (including chewing gum) 1 hour prior to testing. They will have you drink a special solution containing urea and have you blow into a bag    Vaya al laboratorio para completar esta prueba. Asegúrese de no melvina un PPI (omeprazol/pantoprazol) o un bloqueador H2 (famotidina) brendan las 2 semanas anteriores a la prueba, ya que esto podría darren abhi resultado un resultado falso negativo. Tampoco coma katherine hora antes de la prueba.  H-pylori breath test     This test requires the adult patient (>17 years of age) to fast for 1 hour prior to test administration.   The patient should not have taken antibiotics, proton pump inhibitors (e.g., Prilosec, Prevacid, Aciphex, Nexium),   or bismuth preparations (e.g., Pepto-Bismol) within the previous 14 days.   The effect of histamine 2-receptor antogonists (e.g., Axid, Pepcid, Tagamet, Zantac)   may reduce urease activity on urea breath tests and should be discontinued for 24-48 hours before the sample is collected.   When used to monitor treatment, the test should be performed four weeks after cessation of definitive therapy.   The patient should be informed that the  Pranactin-Citric drink that will be administered contains phenylalanine.   Phenylketonurics restrict dietary phenylalanine.             To schedule a test at any [facility], call Central Scheduling at [phone] /[phone], Monday through Friday between 7:30am to 6pm and on Saturday between 8am and 1pm.   Evening and weekend appointments for your exam are available.   [facility] [place] rivers a Central scheduling al [phone] /[phone] para programar jose maria thomas.    H. Pylori testing recall entered. Due 5/2025.      Plan:  Will start w/ quadruple therapy (PPI + tetra + metronidazole + pepto-bismuth) x 14 days.   I have instructed patient to check repeat H.pylori test in 8-10 weeks to ensure eradication (order placed).      Sincerely,    Sussy Gaspar, CHEN  [facility] MEDICAL [facility], [address] [id number] [phone]

## (undated) NOTE — LETTER
8/4/2020          To Whom It May Concern:    Ranjan Douglass is currently under my medical care and may not return to work at this time. Please excuse Nenajoseph Sheryl for 14 days. She may return to work on 8/6/2020. Activity is restricted as follows: none.     If you

## (undated) NOTE — LETTER
Date & Time: 3/3/2025, 3:13 PM  Patient: Parisa Lugo  Encounter Provider(s):    Renata Michael PA-C       To Whom It May Concern:    Parisa Lugo was seen and treated in our department on 3/3/2025.     If you have any questions or concerns, please do not hesitate to call.        _____________________________  Physician/APC Signature

## (undated) NOTE — LETTER
Patient Name: Parisa Lugo : 1980  Medical Record #: [id number] Printed: 2024  Page 1 of 2                                          [facility]  [address], [place], [place]  Autorización para operación y procedimiento quirúrgico                                                                                                      Por la presente, autorizo a MISSY Merida MD, mi médico y al asistente a realizar la operación/procedimiento quirúrgico a continuación, así abhi a administrar la anestesia que determine necesaria mi médico Nombre (s) de la operación/procedimiento: COLONOSCOPY en Parisa Lugo     Reconozco que brendan la operación/procedimiento quirúrgico, las condiciones imprevistas pueden requerir de procedimientos adicionales o diferentes a aquellos mencionados anteriormente.  Por lo tanto, autorizo y solicito además que el cirujano antes mencionado, los asistentes o las personas designadas realicen los procedimientos que, a pandey juicio, roger necesarios y convenientes.    Mi cirujano/médico jackson discutido antes de mi cirugía los posibles beneficios, riesgos y efectos secundarios de tanisha procedimiento, la probabilidad de alcanzar las metas y los posibles problemas que puedan ocurrir brendan la recuperación.  Ellos también jose discutido las alternativas razonables al procedimiento, incluso los riesgos, beneficios y efectos secundarios relacionados con las alternativas y los riesgos relacionados con no realizar tanisha procedimiento.  Jose respondido a todas mis preguntas y confirmo que no se ha dado ninguna garantía en cuanto a los resultados que pueda obtener.    En melva de que surja la necesidad brendan mi operación o brendan el periodo postoperatorio, también autorizo se aplique isaías y/o productos sanguíneos.  Asimismo, entiendo que a pesar de las cuidadosas pruebas y análisis de isaías o de los productos sanguíneos que realizan las entidades recolectoras, todavía puedo estar  sujeto a efectos adversos abhi resultado de recibir katherine transfusión de isaías y/o productos sanguíneos.  A continuación se mencionan algunos, aunque no todos, los riesgos potenciales que pueden ocurrir: fiebre y reacciones alérgicas, reacciones hemolíticas, trasmisión de enfermedades abhi hepatitis, SIDA y citomegalovirus (CMV), así abhi sobrecarga de líquidos.   En melva de que desee tener katherine transfusión autóloga de mi propia isaías o katherine transfusión de un donante dirigido.  Lo discutiré con mi médico.  Check only if Refusing Blood or Blood Products  I understand refusal of blood or blood products as deemed necessary by my physician may have serious consequences to my condition to include possible death. I hereby assume responsibility for my refusal and release the hospital, its personnel, and my physicians from any responsibility for the consequences of my refusal.           o  Refuse       Autorizo el uso de cualquier muestra, órgano, tejido, parte del cuerpo u objeto extraño que pueda ser extraído de mi cuerpo brendan la operación/procedimiento para fines de diagnóstico, investigación o de enseñanza y pandey desecho posterior por las autoridades del hospital.  También, autorizo la revelación de los resultados de las pruebas de muestras y/o los informes escritos al médico tratante abhi personal médico del hospital u otros médicos de referencia o consulta involucrados en mi atención, a discreción del patólogo o de mi médico tratante.    Doy consentimiento para que se fotografíen o graben vídeos de las operaciones o procedimientos a realizarse, incluidas las partes de mi cuerpo que roger adecuadas para propósitos médicos, científicos o educativos, en el entendido de que, mi identidad no sea revelada por las fotografías o por textos descriptivos que las acompañen.  Si el procedimiento es fotografiado o grabado en vídeo, el cirujano obtendrá la imagen, cinta de vídeo o CD original.  El hospital no se hará responsable por  el almacenamiento, la revelación o el mantenimiento de la imagen, cinta o CD.    Autorizo la presencia de un especialista de producto u observadores en el quirófano, según lo considere necesario mi médico o las personas que éste designe.     Reconozco que en melva de que mi procedimiento resulte en un tiempo prolongado de radiografía/fluoroscopia, puedo desarrollar katherine reacción en la piel.    Si tengo katherine orden de No intentar la reanimación (PHILIPP), sergio estado se suspenderá mientras esté en el quirófano, la tato de procedimientos y brendan el periodo de recuperación a menos que yo indique lo contrario explícitamente (o katherine persona autorizada a darren el consentimiento en mi nombre). El cirujano o mi médico tratante determinarán cuándo termina el periodo de recuperación aplicable a los efectos de restablecer la orden de PHILIPP.  Pacientes que se realizan un procedimiento de esterilización: Entiendo que si el procedimiento tiene éxito, los resultados serán permanentes y que, por lo tanto, me será imposible inseminar, concebir o tener hijos.  Asimismo, entiendo que el procedimiento tiene abhi propósito la esterilidad, aunque el resultado no está garantizado.   Admito que mi médico me ha explicado la aplicación de sedación/analgesia, incluidos los riesgos y beneficios y, consiento a la administración de sedación/analgesia conforme sea necesario o conveniente a juicio de mi médico.      CERTIFICO QUE HE LEÍDO Y COMPRENDIDO EL CONSENTIMIENTO ANTERIOR PARA LA OPERACIÓN y/o PROCEDIMIENTO.             ______________________________________  _______________________________  Firma del paciente      Firma de la persona responsible  Signature of patient      Signature of responsible person                        ___________________________________                                   Nombre en imprenta de la persona responsible         Name of responsible person          ___________________________________                 Relación con el  paciente         Relationship to patient    _________________________________________  ______________ ________________  Firma del testigo          Fecha / Date Hora / Time  Signature of witness    DECLARACÓN DEL MÉDICO Mediante mi firma al calce, afirmo que antes de la hora del procedimiento, he explicadoal paciente y/o a pandey representante legal, los riesgos y beneficios involucrados en el tratamiento propuesto, así comocualquier alternativa razonable al tratamiento propuesto. También le(s) he explicado los riesgos y beneficios involucradosen el rechazo del tratarniento propuesto y alternativas al tratamiento propuesto, y he respondido a las preguntas del(la) paciente(My signature below affirms that prior to the time of the procedure, I have explained to the patient and/or his/her guardian, therisks and benefits involved in the proposed treatment and any reasonable alternative to the proposed treatment. I have alsoexplained the risks and benefits involved in refusal of the proposed treatment and have answered the patient's questions.)    ________________________________________   _________________________   _____________   (Firma del médico/Signature of Physician)                                    (Fecha/Date)                                             (Hora/Time)      Patient Name: Parisa Lugo     : 1980                 Printed: 2024      Medical Record #: [id number]                                              Page 2 of 2

## (undated) NOTE — LETTER
08/26/21        Manuel Menard  [address]      Dear Lupis Chairez,    Our records indicate that you have outstanding lab work and or testing that was ordered for you and has not yet been completed:  Orders Placed This Encounter      ALT(SGPT) [E

## (undated) NOTE — LETTER
Cady Raymundo, Fnp-c  [address]  [address]  [address] [facility], [address] [facility]       09/20/18        Patient: Serena Lawson   YOB: 1980   Date of Visit: 9/20/2018       Dear  Dr. Leigha Pruitt, ALEX, FNP-C,      Thank you for referring Serena Lawson t

## (undated) NOTE — LETTER
[facility]  [facility] [address]  [place] [address]  [facility]: [phone]  FAX: [phone]     Date:  12/5/2023     Patient:  Shelton Peña         To Whom it may concern: This letter has been written at the patient's request. The above patient was seen at the [facility] for treatment of a medical condition. This patient is cleared to return to work starting 12/1/23.         Sincerely,    Dr. Shaji Yost